# Patient Record
Sex: MALE | Race: ASIAN | NOT HISPANIC OR LATINO | URBAN - METROPOLITAN AREA
[De-identification: names, ages, dates, MRNs, and addresses within clinical notes are randomized per-mention and may not be internally consistent; named-entity substitution may affect disease eponyms.]

---

## 2020-01-04 ENCOUNTER — EMERGENCY (EMERGENCY)
Facility: HOSPITAL | Age: 50
LOS: 1 days | Discharge: HOME | End: 2020-01-04
Attending: EMERGENCY MEDICINE | Admitting: EMERGENCY MEDICINE
Payer: COMMERCIAL

## 2020-01-04 VITALS
SYSTOLIC BLOOD PRESSURE: 136 MMHG | WEIGHT: 177.91 LBS | RESPIRATION RATE: 17 BRPM | OXYGEN SATURATION: 95 % | HEART RATE: 91 BPM | TEMPERATURE: 100 F | HEIGHT: 66 IN | DIASTOLIC BLOOD PRESSURE: 84 MMHG

## 2020-01-04 VITALS
SYSTOLIC BLOOD PRESSURE: 121 MMHG | OXYGEN SATURATION: 100 % | HEART RATE: 91 BPM | TEMPERATURE: 99 F | RESPIRATION RATE: 18 BRPM | DIASTOLIC BLOOD PRESSURE: 75 MMHG

## 2020-01-04 PROCEDURE — 71046 X-RAY EXAM CHEST 2 VIEWS: CPT | Mod: 26

## 2020-01-04 PROCEDURE — 99283 EMERGENCY DEPT VISIT LOW MDM: CPT

## 2020-01-04 PROCEDURE — 71046 X-RAY EXAM CHEST 2 VIEWS: CPT

## 2020-01-04 PROCEDURE — 99284 EMERGENCY DEPT VISIT MOD MDM: CPT | Mod: 25

## 2020-01-04 RX ORDER — ACETAMINOPHEN 500 MG
650 TABLET ORAL ONCE
Refills: 0 | Status: COMPLETED | OUTPATIENT
Start: 2020-01-04 | End: 2020-01-04

## 2020-01-04 RX ORDER — FLUTICASONE PROPIONATE 50 MCG
1 SPRAY, SUSPENSION NASAL
Qty: 1 | Refills: 0
Start: 2020-01-04 | End: 2020-01-10

## 2020-01-04 RX ADMIN — Medication 650 MILLIGRAM(S): at 13:11

## 2020-01-04 RX ADMIN — Medication 1 TABLET(S): at 13:05

## 2020-01-04 RX ADMIN — Medication 650 MILLIGRAM(S): at 12:30

## 2020-01-04 NOTE — ED PROVIDER NOTE - OBJECTIVE STATEMENT
Pt is a 51 yo male with pmhx of htn CAD stent x4 on asa c/o of productive cough post nasal drip headache and fever for one week. Pt states his son was also sick. Pt took Tylenol last night. Pt denies any nvd rash abdominal pain nvd recent travels leg swelling. Pt did not get flu shot this year

## 2020-01-04 NOTE — ED PROVIDER NOTE - PATIENT PORTAL LINK FT
You can access the FollowMyHealth Patient Portal offered by [facility] by registering at the following website: [email or web address]. By joining [email or web address]’s FollowMyHealth portal, you will also be able to view your health information using other applications (apps) compatible with our system.

## 2020-01-04 NOTE — ED PROVIDER NOTE - ATTENDING CONTRIBUTION TO CARE
Pt is a 51 yo male who presents to the ED with a cc of cough, congestion.  Pt with no significant past medical history.  Reports that symptoms first began this past Sunday.  He reports nasal congestion, with cough productive of clear to white sputum, fevers T max of 102, and frontal HA.  He has been taking Tylenol without improvement in symptoms.  Pt has not followed with his PMD for this.  Denies N/V/D/C, CP, SOB, abd pain, ext numbness.  Denies neck pain.  Pt denies ear pain.  He does report that he Pt is a 51 yo male who presents to the ED with a cc of cough, congestion.  Pt with no significant past medical history.  Reports that symptoms first began this past Sunday.  He reports nasal congestion, with cough productive of clear to white sputum, fevers T max of 102, and frontal HA.  He has been taking Tylenol without improvement in symptoms.  Last took Tylenol last night.  Pt has not followed with his PMD for this.  Denies N/V/D/C, CP, SOB, abd pain, ext numbness.  Denies neck pain.  Pt denies ear pain.  He does report that he has a sore throat after he has been coughing for some time but denies difficulty swallowing.  On exam pt lying in bed NAD, NCAT, nasal congestion noted, TTP to bilateral frontal and maxillary sinus regions, heart RRR, lungs CTA, abd soft NT/ND.  FROM of neck with no meningeal signs.  Pt with what appears to be acute sinusitis.  Out of the window for treatment for flu so no utility for swab.  Will obtain chest x-ray.  Pt appears stable for outpatient therapy.  No evidence of hypoxia

## 2020-01-04 NOTE — ED PROVIDER NOTE - ENMT, MLM
Airway patent, + nasal congestion Mouth with normal mucosa. Throat has no vesicles, no oropharyngeal exudates and uvula is midline.

## 2020-01-04 NOTE — ED ADULT TRIAGE NOTE - LOCATION:
Left arm; Eye Protection Verbiage: Before proceeding with the stage, a plastic scleral shield was inserted. The globe was anesthetized with a few drops of 1% lidocaine with 1:100,000 epinephrine. Then, an appropriate sized scleral shield was chosen and coated with lacrilube ointment. The shield was gently inserted and left in place for the duration of each stage. After the stage was completed, the shield was gently removed.

## 2020-08-29 ENCOUNTER — EMERGENCY (EMERGENCY)
Facility: HOSPITAL | Age: 50
LOS: 1 days | Discharge: HOME | End: 2020-08-29
Attending: EMERGENCY MEDICINE | Admitting: EMERGENCY MEDICINE
Payer: COMMERCIAL

## 2020-08-29 VITALS
WEIGHT: 175.05 LBS | HEART RATE: 74 BPM | TEMPERATURE: 99 F | OXYGEN SATURATION: 97 % | DIASTOLIC BLOOD PRESSURE: 90 MMHG | HEIGHT: 66 IN | RESPIRATION RATE: 16 BRPM | SYSTOLIC BLOOD PRESSURE: 147 MMHG

## 2020-08-29 VITALS
TEMPERATURE: 98 F | SYSTOLIC BLOOD PRESSURE: 162 MMHG | OXYGEN SATURATION: 98 % | RESPIRATION RATE: 16 BRPM | HEART RATE: 53 BPM | DIASTOLIC BLOOD PRESSURE: 89 MMHG

## 2020-08-29 PROCEDURE — 12001 RPR S/N/AX/GEN/TRNK 2.5CM/<: CPT

## 2020-08-29 PROCEDURE — 99283 EMERGENCY DEPT VISIT LOW MDM: CPT

## 2020-08-29 PROCEDURE — 90715 TDAP VACCINE 7 YRS/> IM: CPT

## 2020-08-29 PROCEDURE — 99284 EMERGENCY DEPT VISIT MOD MDM: CPT | Mod: 25

## 2020-08-29 PROCEDURE — 90471 IMMUNIZATION ADMIN: CPT

## 2020-08-29 RX ORDER — IBUPROFEN 200 MG
600 TABLET ORAL ONCE
Refills: 0 | Status: COMPLETED | OUTPATIENT
Start: 2020-08-29 | End: 2020-08-29

## 2020-08-29 RX ORDER — TETANUS TOXOID, REDUCED DIPHTHERIA TOXOID AND ACELLULAR PERTUSSIS VACCINE, ADSORBED 5; 2.5; 8; 8; 2.5 [IU]/.5ML; [IU]/.5ML; UG/.5ML; UG/.5ML; UG/.5ML
0.5 SUSPENSION INTRAMUSCULAR ONCE
Refills: 0 | Status: COMPLETED | OUTPATIENT
Start: 2020-08-29 | End: 2020-08-29

## 2020-08-29 RX ADMIN — Medication 600 MILLIGRAM(S): at 22:02

## 2020-08-29 RX ADMIN — TETANUS TOXOID, REDUCED DIPHTHERIA TOXOID AND ACELLULAR PERTUSSIS VACCINE, ADSORBED 0.5 MILLILITER(S): 5; 2.5; 8; 8; 2.5 SUSPENSION INTRAMUSCULAR at 20:58

## 2020-08-29 RX ADMIN — Medication 600 MILLIGRAM(S): at 22:07

## 2020-08-29 RX ADMIN — Medication 1 TABLET(S): at 20:58

## 2020-08-29 NOTE — ED PROVIDER NOTE - CLINICAL SUMMARY MEDICAL DECISION MAKING FREE TEXT BOX
pt with flap laceration / near avulsion of distal left 5th finger skin,  pt with active bleeding, will hold pressure and attempt to dermabond skin flap in place since with .5cm region still attached and with good blood supply. abx,  fu hand pt with flap laceration / near avulsion of distal left 5th finger skin,  pt with active bleeding, will hold pressure and attempt to dermabond skin flap in place since with .5cm region still attached and with good blood supply. abx,  wound check tuesday

## 2020-08-29 NOTE — ED ADULT NURSE NOTE - NSIMPLEMENTINTERV_GEN_ALL_ED
Implemented All Universal Safety Interventions:  [place] to call system. Call bell, personal items and telephone within reach. Instruct patient to call for assistance. Room bathroom lighting operational. Non-slip footwear when patient is off stretcher. Physically safe environment: no spills, clutter or unnecessary equipment. Stretcher in lowest position, wheels locked, appropriate side rails in place.

## 2020-08-29 NOTE — ED PROVIDER NOTE - PATIENT PORTAL LINK FT
You can access the FollowMyHealth Patient Portal offered by [facility] by registering at the following website: [email or web address]. By joining [employer]’s FollowMyHealth portal, you will also be able to view your health information using other applications (apps) compatible with our system.

## 2020-08-29 NOTE — ED PROVIDER NOTE - NSFOLLOWUPINSTRUCTIONS_ED_ALL_ED_FT
do not apply ointment.   return for increasing pain, redness, pus,  take motrin for mild pain,  keep covered with non stick guaze or band aid.  take augmentin as directed

## 2020-08-29 NOTE — ED PROVIDER NOTE - NO SIGNIFICANT PAST SURGICAL HISTORY
Progress Notes by Dreyer, Roman, MD at 08/07/18 10:29 AM     Author:  Dreyer, Roman, MD Service:  (none) Author Type:  Physician     Filed:  08/07/18 10:46 AM Encounter Date:  8/7/2018 Status:  Signed     :  Dreyer, Roman, MD (Physician)              Alexsander Davis is a very pleasant[RD1.1C] 59 year old[RD1.2T] male, here for:[RD1.1C]        4 days of[RD1.2M] right[RD1.2T] knee pain  Lateral knee  Took tylenol with some relief[RD1.2M]  Alexsander[RD1.2T] denies injury or trauma  He woke up with the pain.  He kept exercising[RD1.2M] without[RD1.2T] relief.  He has pain with walking and on the stairs.  There is no pain during the night while sleeping.  He did put ice on it.  Denies swelling  Denies instability sensation.[RD1.2M]       PMH:  -hypertension  -mild hyperlipidemia  -benign prostatic hypertrophy    -lipomatous mass on the upper thoracic, lower occipital area.  -screening colonoscopy: declines.  -Neck lipoma resection 1/2016  -appendectomy 3/2018      SOCIAL HX:  -no cig  -occ etoh  -work:  at [employer].    FAMILY HX:  None    MEDS LIST:[RD1.1C]  Current Outpatient Prescriptions     Medication  Sig   • losartan (COZAAR) 25 MG tablet Take 1 Tab by mouth daily.   • doxazosin (CARDURA) 2 MG tablet TAKE 1 TABLET 30 MINUTES BEFORE BEDTIME   • sildenafil (VIAGRA) 50 MG tablet Take 1 Tab by mouth as needed for Erectile Dysfunction.[RD1.2T]        ROS:[RD1.1C]  12 point review of systems negative, other than the HPI[RD1.3T]       -------------------------    PHYSICAL EXAM:[RD1.1C]    /86  Pulse 90  Temp 98.4 °F (36.9 °C)  Ht 5' 2\" (1.575 m)  Wt 188 lb (85.3 kg)  BMI 34.39 kg/m2[RD1.2T]       GENERAL:  He is a well developed, well-nourished male, no acute distress,  ambulatory, alert/oriented x 3    HEAD: normocephalic, atraumatic, no scalp lesions, no rashes seen.    EYES:  PERRL, EOMI, Right eye: non-injected conjunctiva, no icterus.  Left eye: non-injected conjunctiva, no icterus.     NECK:   Supple, normal flexion/extension/rotation, no lymphadenopathy, no thyromegaly, no jvd, no carotid bruits[RD1.1C]        right[RD1.3M] knee examination.  [+][RD1.3T] mildly painful during weight bearing  Palpation: mildly tender lateral knee to palpation, below the joint line.[RD1.3M]  1. Alignment: normal   2. Range of motion: normal    3. Effusion: normal    4. Joint line tenderness:[RD1.3T] none[RD1.3M]   5. Lachman test:[RD1.3T] *-[RD1.3M]    6. Anterior drawer test:[RD1.3T] -[RD1.3M]   7. Posterior drawer test:[RD1.3T] -[RD1.3M]   8. Lateral pivot shift:[RD1.3T] -[RD1.3M]   9. Kylee test:[RD1.3T] -[RD1.3M]        -------------------------     ASSESSMENT / PLAN:[RD1.1C]      1. Acute pain of right knee[RD1.4T]  normal range of motion[RD1.3T], no signs of meniscus or ligament injury  Most likely has mild bursitis or patello-femoral discomfort  Recommend: ice 15 minutes 2 times daily  Relafen 500 mg bid with food x 10 days  If not improved in 10 days, call us: May need imaging and[RD1.3M] Orthopedics[RD1.3T] consultation[RD1.3M]     FOLLOW UP VISIT with R. DREYER, MD in Dec , 2018  or earlier, if needed.     All of the instruction documented above was provided to the patient in writing via an After-Visit Summary.      The patient indicates understanding of these issues and agrees with the plan.  _______________[RD1.1C]  Electronically Signed by:    Roman Dreyer, MD , 8/7/2018[RD1.2T]      Revision History        User Key Date/Time User Provider Type Action    > RD1.4 08/07/18 10:46 AM Dreyer, Roman, MD Physician Sign     RD1.3 08/07/18 10:40 AM Dreyer, Roman, MD Physician      RD1.2 08/07/18 10:32 AM Dreyer, Roman, MD Physician      RD1.1 08/07/18 10:29 AM Dreyer, Roman, MD Physician     C - Copied, M - Manual, T - Template             <<----- Click to add NO significant Past Surgical History

## 2020-08-29 NOTE — ED PROVIDER NOTE - SKIN, MLM
Skin normal color for race, warm, dry , left 5th digit on medial aspect with near avulsion of skin 1cm x .3 cm and attached by .5cm on more volar aspect. when flap lifted  wound goes moderately deep with sq tissue and bleeding

## 2020-08-29 NOTE — ED ADULT NURSE NOTE - OBJECTIVE STATEMENT
Patient complaining of laceration to left small finger sustained while getting something in his car dressing noted to area cleansed the finger waiting for MD evaluation.

## 2020-08-29 NOTE — ED PROVIDER NOTE - OBJECTIVE STATEMENT
Pt is a 49 yo male pmhx htn, right hand dominant, ? last td. pt had box start to fall and grazed pts left 5th digit, no crush. pt with bleeding from left 5th digit which wont stop.  no other injuries, from.  pt states pain only to touch.

## 2020-09-04 ENCOUNTER — INPATIENT (INPATIENT)
Facility: HOSPITAL | Age: 50
LOS: 2 days | Discharge: HOME | DRG: 247 | End: 2020-09-07
Attending: HOSPITALIST | Admitting: HOSPITALIST
Payer: COMMERCIAL

## 2020-09-04 VITALS
RESPIRATION RATE: 16 BRPM | HEART RATE: 78 BPM | DIASTOLIC BLOOD PRESSURE: 70 MMHG | WEIGHT: 175.05 LBS | OXYGEN SATURATION: 100 % | TEMPERATURE: 97 F | SYSTOLIC BLOOD PRESSURE: 150 MMHG | HEIGHT: 66 IN

## 2020-09-04 LAB — GAS PNL BLDV: SIGNIFICANT CHANGE UP

## 2020-09-04 PROCEDURE — 99285 EMERGENCY DEPT VISIT HI MDM: CPT

## 2020-09-04 PROCEDURE — 93010 ELECTROCARDIOGRAM REPORT: CPT

## 2020-09-04 NOTE — ED PROVIDER NOTE - EKG #1 DATE/TIME
04-Sep-2020 23:32 [facility]  Report of Consultation    Sarath Marcial Patient Status:  Observation    1942 MRN [id number]   [facility] 4NW-A Attending Blayne Mendenhall MD   Hosp Day # 0 PCP Andreas Hernandez MD     Reason for Consultation: Abnormal CT Laterality Date   • ANGIOPLASTY (CORONARY)  1999       • APPENDECTOMY      teenager   • BONE MARROW BIOPSY AND ASPIRATION  12/2012    Hantel-neg   • CABG  10/25/13    AORTIC VALVE REPLACEMENT   • CARDIAC CATHETERIZATION  2008/2013   • Adela Conklin time  aspirin 81 MG Oral Tab, Take 1 tablet (81 mg total) by mouth 3 (three) times a week., Disp: , Rfl: 0, 1/8/2020 at Unknown time  Rosuvastatin Calcium 20 MG Oral Tab, Take 0.5 tablets (10 mg total) by mouth nightly., Disp: 90 tablet, Rfl: 3, Past Week (3.125 mg total) by mouth 2 (two) times daily with meals. , Disp: 180 tablet, Rfl: 3, 1/9/2020 at Unknown time  Calcium Carbonate Antacid (TUMS FRESHERS) 500 MG Oral Chew Tab, Chew 1 tablet by mouth nightly.  , Disp: , Rfl: 0      Review of Systems:   Const 1900  Gross per 24 hour   Intake 260 ml   Output —   Net 260 ml       Physical Exam:   General: alert, cooperative, oriented. No respiratory distress. Head: Normocephalic, without obvious abnormality, atraumatic. Eyes: Conjunctivae/corneas clear.   No speak against the development of a several centimeter cancer and so rapid period of time. I agree with the recommendation that he complete a course of antibiotic therapy and have a follow-up CT scan of the chest in about 6 weeks.   We will follow along wit

## 2020-09-04 NOTE — ED PROVIDER NOTE - CLINICAL SUMMARY MEDICAL DECISION MAKING FREE TEXT BOX
Pt p/w sob w/ EKG changes per EMS, c/f ACS vs new CHF vs less likely infectious or PE. Plan: labs, EKG, cxr, pt rec'd asa and NTG, likely admit

## 2020-09-04 NOTE — ED PROVIDER NOTE - ATTENDING CONTRIBUTION TO CARE
attending [employer]: 50yM h/o CAD and MI with prior stent, former smoker, HTN, HLD BIBEMS for dyspnea that started after eating dinner. Denies chest pain. Given 325 ASA and 1 SL nitro by EMS with improved symptoms. EKG with EMS with inferior and anterior ST depressions with ?elevation in aVR. Last cath many years ago. Denies syncope, nausea/vomiting. On exam, well appearing, mild diaphoresis, S1S2 normal, lungs grossly clear, no LE edema. Repeat EKG without true JUSTIN. Will place on tele, obtain serial EKGs, labs including trop, cardiology eval for cath, admit

## 2020-09-04 NOTE — ED ADULT NURSE NOTE - OBJECTIVE STATEMENT
Patient is a 50 year old male complaining of sob. Patient has history of stents x2, htn, hld, MI. Patient is A&O x 4. Pt reports SOB starting after eating dinner. pt denies chest pain/ tightness. pt states he recently stopped taking Plavix as per his pcp. pt states since arriving to ED SOB has resolved, pt took home dose of 81mg Asprin at home, as per ems gave 3 x 81mg Asprin and one dose of nitro. pt arrived with left 20g in hand. as per ems pt had abnormal ekg. Denies complaints of chest pain, fevers, chills, n/v/d, headache, syncope, burning urination, blood in urine, blood in stool. Abd is soft, non tender, non distended. Skin is warm and dry. Color is consistent with ethnicity. Safety and comfort maintained. Will continue to monitor.

## 2020-09-04 NOTE — ED PROVIDER NOTE - OBJECTIVE STATEMENT
50 M hx HTN HLD CAD s/p stent in 2007 p/w SoB, starting after eating dinner 30 min after finishing. Aassociated with light hedaedness but did not pass out. He was feeling fine earlier in the day. Denies CP, n/v, abd pain, diaphoresis,     Card Dr Hampton of [facility] 50 M hx HTN HLD CAD s/p stent in 2007 p/w SoB, starting after eating dinner 30 min after finishing. Associated with light hedaedness but did not pass out. He was feeling fine earlier in the day. Denies CP, n/v, abd pain, diaphoresis. Rec' 1 spray NTG and 325 ASA By EMS.     Card Dr Hampton of [facility]

## 2020-09-05 DIAGNOSIS — I21.4 NON-ST ELEVATION (NSTEMI) MYOCARDIAL INFARCTION: ICD-10-CM

## 2020-09-05 DIAGNOSIS — E78.5 HYPERLIPIDEMIA, UNSPECIFIED: ICD-10-CM

## 2020-09-05 DIAGNOSIS — I10 ESSENTIAL (PRIMARY) HYPERTENSION: ICD-10-CM

## 2020-09-05 LAB
ALBUMIN SERPL ELPH-MCNC: 4.5 G/DL — SIGNIFICANT CHANGE UP (ref 3.3–5)
ALP SERPL-CCNC: 81 U/L — SIGNIFICANT CHANGE UP (ref 40–120)
ALT FLD-CCNC: 31 U/L — SIGNIFICANT CHANGE UP (ref 10–45)
ANION GAP SERPL CALC-SCNC: 12 MMOL/L — SIGNIFICANT CHANGE UP (ref 5–17)
ANION GAP SERPL CALC-SCNC: 9 MMOL/L — SIGNIFICANT CHANGE UP (ref 5–17)
APTT BLD: 100.6 SEC — HIGH (ref 27.5–35.5)
APTT BLD: 30.4 SEC — SIGNIFICANT CHANGE UP (ref 27.5–35.5)
AST SERPL-CCNC: 31 U/L — SIGNIFICANT CHANGE UP (ref 10–40)
BASOPHILS # BLD AUTO: 0.06 K/UL — SIGNIFICANT CHANGE UP (ref 0–0.2)
BASOPHILS NFR BLD AUTO: 0.6 % — SIGNIFICANT CHANGE UP (ref 0–2)
BILIRUB SERPL-MCNC: 0.8 MG/DL — SIGNIFICANT CHANGE UP (ref 0.2–1.2)
BUN SERPL-MCNC: 14 MG/DL — SIGNIFICANT CHANGE UP (ref 7–23)
BUN SERPL-MCNC: 17 MG/DL — SIGNIFICANT CHANGE UP (ref 7–23)
CALCIUM SERPL-MCNC: 8.8 MG/DL — SIGNIFICANT CHANGE UP (ref 8.4–10.5)
CALCIUM SERPL-MCNC: 9.7 MG/DL — SIGNIFICANT CHANGE UP (ref 8.4–10.5)
CHLORIDE SERPL-SCNC: 104 MMOL/L — SIGNIFICANT CHANGE UP (ref 96–108)
CHLORIDE SERPL-SCNC: 107 MMOL/L — SIGNIFICANT CHANGE UP (ref 96–108)
CK MB BLD-MCNC: 7 % — HIGH (ref 0–3.5)
CK MB CFR SERPL CALC: 64.1 NG/ML — HIGH (ref 0–6.7)
CK SERPL-CCNC: 920 U/L — HIGH (ref 30–200)
CO2 SERPL-SCNC: 25 MMOL/L — SIGNIFICANT CHANGE UP (ref 22–31)
CO2 SERPL-SCNC: 26 MMOL/L — SIGNIFICANT CHANGE UP (ref 22–31)
CREAT SERPL-MCNC: 0.96 MG/DL — SIGNIFICANT CHANGE UP (ref 0.5–1.3)
CREAT SERPL-MCNC: 1.14 MG/DL — SIGNIFICANT CHANGE UP (ref 0.5–1.3)
EOSINOPHIL # BLD AUTO: 0.22 K/UL — SIGNIFICANT CHANGE UP (ref 0–0.5)
EOSINOPHIL NFR BLD AUTO: 2.1 % — SIGNIFICANT CHANGE UP (ref 0–6)
GLUCOSE SERPL-MCNC: 138 MG/DL — HIGH (ref 70–99)
GLUCOSE SERPL-MCNC: 154 MG/DL — HIGH (ref 70–99)
HCT VFR BLD CALC: 40.2 % — SIGNIFICANT CHANGE UP (ref 39–50)
HCT VFR BLD CALC: 42.9 % — SIGNIFICANT CHANGE UP (ref 39–50)
HGB BLD-MCNC: 13.4 G/DL — SIGNIFICANT CHANGE UP (ref 13–17)
HGB BLD-MCNC: 14.6 G/DL — SIGNIFICANT CHANGE UP (ref 13–17)
IMM GRANULOCYTES NFR BLD AUTO: 0.3 % — SIGNIFICANT CHANGE UP (ref 0–1.5)
INR BLD: 0.96 RATIO — SIGNIFICANT CHANGE UP (ref 0.88–1.16)
LYMPHOCYTES # BLD AUTO: 4.34 K/UL — HIGH (ref 1–3.3)
LYMPHOCYTES # BLD AUTO: 42.3 % — SIGNIFICANT CHANGE UP (ref 13–44)
MAGNESIUM SERPL-MCNC: 2.2 MG/DL — SIGNIFICANT CHANGE UP (ref 1.6–2.6)
MCHC RBC-ENTMCNC: 27.6 PG — SIGNIFICANT CHANGE UP (ref 27–34)
MCHC RBC-ENTMCNC: 27.9 PG — SIGNIFICANT CHANGE UP (ref 27–34)
MCHC RBC-ENTMCNC: 33.3 GM/DL — SIGNIFICANT CHANGE UP (ref 32–36)
MCHC RBC-ENTMCNC: 34 GM/DL — SIGNIFICANT CHANGE UP (ref 32–36)
MCV RBC AUTO: 82 FL — SIGNIFICANT CHANGE UP (ref 80–100)
MCV RBC AUTO: 82.7 FL — SIGNIFICANT CHANGE UP (ref 80–100)
MONOCYTES # BLD AUTO: 1.01 K/UL — HIGH (ref 0–0.9)
MONOCYTES NFR BLD AUTO: 9.8 % — SIGNIFICANT CHANGE UP (ref 2–14)
NEUTROPHILS # BLD AUTO: 4.61 K/UL — SIGNIFICANT CHANGE UP (ref 1.8–7.4)
NEUTROPHILS NFR BLD AUTO: 44.9 % — SIGNIFICANT CHANGE UP (ref 43–77)
NRBC # BLD: 0 /100 WBCS — SIGNIFICANT CHANGE UP (ref 0–0)
NRBC # BLD: 0 /100 WBCS — SIGNIFICANT CHANGE UP (ref 0–0)
NT-PROBNP SERPL-SCNC: 26 PG/ML — SIGNIFICANT CHANGE UP (ref 0–300)
PLATELET # BLD AUTO: 182 K/UL — SIGNIFICANT CHANGE UP (ref 150–400)
PLATELET # BLD AUTO: 198 K/UL — SIGNIFICANT CHANGE UP (ref 150–400)
POTASSIUM SERPL-MCNC: 3.3 MMOL/L — LOW (ref 3.5–5.3)
POTASSIUM SERPL-MCNC: 3.7 MMOL/L — SIGNIFICANT CHANGE UP (ref 3.5–5.3)
POTASSIUM SERPL-SCNC: 3.3 MMOL/L — LOW (ref 3.5–5.3)
POTASSIUM SERPL-SCNC: 3.7 MMOL/L — SIGNIFICANT CHANGE UP (ref 3.5–5.3)
PROT SERPL-MCNC: 7.8 G/DL — SIGNIFICANT CHANGE UP (ref 6–8.3)
PROTHROM AB SERPL-ACNC: 11.5 SEC — SIGNIFICANT CHANGE UP (ref 10.6–13.6)
RBC # BLD: 4.86 M/UL — SIGNIFICANT CHANGE UP (ref 4.2–5.8)
RBC # BLD: 5.23 M/UL — SIGNIFICANT CHANGE UP (ref 4.2–5.8)
RBC # FLD: 12.3 % — SIGNIFICANT CHANGE UP (ref 10.3–14.5)
RBC # FLD: 12.5 % — SIGNIFICANT CHANGE UP (ref 10.3–14.5)
SARS-COV-2 RNA SPEC QL NAA+PROBE: SIGNIFICANT CHANGE UP
SODIUM SERPL-SCNC: 141 MMOL/L — SIGNIFICANT CHANGE UP (ref 135–145)
SODIUM SERPL-SCNC: 142 MMOL/L — SIGNIFICANT CHANGE UP (ref 135–145)
TROPONIN T, HIGH SENSITIVITY RESULT: 24 NG/L — SIGNIFICANT CHANGE UP (ref 0–51)
TROPONIN T, HIGH SENSITIVITY RESULT: 380 NG/L — HIGH (ref 0–51)
TROPONIN T, HIGH SENSITIVITY RESULT: 698 NG/L — HIGH (ref 0–51)
WBC # BLD: 10.27 K/UL — SIGNIFICANT CHANGE UP (ref 3.8–10.5)
WBC # BLD: 9.83 K/UL — SIGNIFICANT CHANGE UP (ref 3.8–10.5)
WBC # FLD AUTO: 10.27 K/UL — SIGNIFICANT CHANGE UP (ref 3.8–10.5)
WBC # FLD AUTO: 9.83 K/UL — SIGNIFICANT CHANGE UP (ref 3.8–10.5)

## 2020-09-05 PROCEDURE — 71045 X-RAY EXAM CHEST 1 VIEW: CPT | Mod: 26

## 2020-09-05 PROCEDURE — 93458 L HRT ARTERY/VENTRICLE ANGIO: CPT | Mod: 26,59

## 2020-09-05 PROCEDURE — 99223 1ST HOSP IP/OBS HIGH 75: CPT

## 2020-09-05 PROCEDURE — 75710 ARTERY X-RAYS ARM/LEG: CPT | Mod: 26,LT,XU

## 2020-09-05 PROCEDURE — 99255 IP/OBS CONSLTJ NEW/EST HI 80: CPT

## 2020-09-05 PROCEDURE — 99152 MOD SED SAME PHYS/QHP 5/>YRS: CPT

## 2020-09-05 PROCEDURE — 92941 PRQ TRLML REVSC TOT OCCL AMI: CPT | Mod: LD

## 2020-09-05 PROCEDURE — 99233 SBSQ HOSP IP/OBS HIGH 50: CPT

## 2020-09-05 PROCEDURE — 92978 ENDOLUMINL IVUS OCT C 1ST: CPT | Mod: 26,LD

## 2020-09-05 RX ORDER — HEPARIN SODIUM 5000 [USP'U]/ML
5000 INJECTION INTRAVENOUS; SUBCUTANEOUS ONCE
Refills: 0 | Status: DISCONTINUED | OUTPATIENT
Start: 2020-09-05 | End: 2020-09-05

## 2020-09-05 RX ORDER — HEPARIN SODIUM 5000 [USP'U]/ML
5000 INJECTION INTRAVENOUS; SUBCUTANEOUS ONCE
Refills: 0 | Status: COMPLETED | OUTPATIENT
Start: 2020-09-05 | End: 2020-09-05

## 2020-09-05 RX ORDER — HEPARIN SODIUM 5000 [USP'U]/ML
5300 INJECTION INTRAVENOUS; SUBCUTANEOUS EVERY 6 HOURS
Refills: 0 | Status: DISCONTINUED | OUTPATIENT
Start: 2020-09-05 | End: 2020-09-05

## 2020-09-05 RX ORDER — LISINOPRIL 2.5 MG/1
40 TABLET ORAL DAILY
Refills: 0 | Status: DISCONTINUED | OUTPATIENT
Start: 2020-09-05 | End: 2020-09-07

## 2020-09-05 RX ORDER — POTASSIUM CHLORIDE 20 MEQ
40 PACKET (EA) ORAL ONCE
Refills: 0 | Status: DISCONTINUED | OUTPATIENT
Start: 2020-09-05 | End: 2020-09-05

## 2020-09-05 RX ORDER — TICAGRELOR 90 MG/1
180 TABLET ORAL ONCE
Refills: 0 | Status: COMPLETED | OUTPATIENT
Start: 2020-09-05 | End: 2020-09-05

## 2020-09-05 RX ORDER — HEPARIN SODIUM 5000 [USP'U]/ML
INJECTION INTRAVENOUS; SUBCUTANEOUS
Qty: 25000 | Refills: 0 | Status: DISCONTINUED | OUTPATIENT
Start: 2020-09-05 | End: 2020-09-05

## 2020-09-05 RX ORDER — POTASSIUM CHLORIDE 20 MEQ
40 PACKET (EA) ORAL ONCE
Refills: 0 | Status: COMPLETED | OUTPATIENT
Start: 2020-09-05 | End: 2020-09-05

## 2020-09-05 RX ORDER — FAMOTIDINE 10 MG/ML
20 INJECTION INTRAVENOUS ONCE
Refills: 0 | Status: COMPLETED | OUTPATIENT
Start: 2020-09-05 | End: 2020-09-05

## 2020-09-05 RX ORDER — ATORVASTATIN CALCIUM 80 MG/1
40 TABLET, FILM COATED ORAL AT BEDTIME
Refills: 0 | Status: DISCONTINUED | OUTPATIENT
Start: 2020-09-05 | End: 2020-09-07

## 2020-09-05 RX ORDER — TICAGRELOR 90 MG/1
90 TABLET ORAL EVERY 12 HOURS
Refills: 0 | Status: DISCONTINUED | OUTPATIENT
Start: 2020-09-05 | End: 2020-09-07

## 2020-09-05 RX ORDER — ASPIRIN/CALCIUM CARB/MAGNESIUM 324 MG
81 TABLET ORAL DAILY
Refills: 0 | Status: DISCONTINUED | OUTPATIENT
Start: 2020-09-05 | End: 2020-09-07

## 2020-09-05 RX ORDER — ACETAMINOPHEN 500 MG
650 TABLET ORAL EVERY 6 HOURS
Refills: 0 | Status: DISCONTINUED | OUTPATIENT
Start: 2020-09-05 | End: 2020-09-07

## 2020-09-05 RX ORDER — METOPROLOL TARTRATE 50 MG
100 TABLET ORAL DAILY
Refills: 0 | Status: DISCONTINUED | OUTPATIENT
Start: 2020-09-05 | End: 2020-09-07

## 2020-09-05 RX ORDER — TICAGRELOR 90 MG/1
180 TABLET ORAL ONCE
Refills: 0 | Status: DISCONTINUED | OUTPATIENT
Start: 2020-09-05 | End: 2020-09-05

## 2020-09-05 RX ORDER — INFLUENZA VIRUS VACCINE 15; 15; 15; 15 UG/.5ML; UG/.5ML; UG/.5ML; UG/.5ML
0.5 SUSPENSION INTRAMUSCULAR ONCE
Refills: 0 | Status: COMPLETED | OUTPATIENT
Start: 2020-09-05 | End: 2020-09-07

## 2020-09-05 RX ADMIN — Medication 40 MILLIEQUIVALENT(S): at 01:31

## 2020-09-05 RX ADMIN — Medication 100 MILLIGRAM(S): at 15:11

## 2020-09-05 RX ADMIN — FAMOTIDINE 20 MILLIGRAM(S): 10 INJECTION INTRAVENOUS at 01:30

## 2020-09-05 RX ADMIN — Medication 81 MILLIGRAM(S): at 13:24

## 2020-09-05 RX ADMIN — HEPARIN SODIUM 1000 UNIT(S)/HR: 5000 INJECTION INTRAVENOUS; SUBCUTANEOUS at 04:29

## 2020-09-05 RX ADMIN — HEPARIN SODIUM 5000 UNIT(S): 5000 INJECTION INTRAVENOUS; SUBCUTANEOUS at 04:29

## 2020-09-05 RX ADMIN — TICAGRELOR 180 MILLIGRAM(S): 90 TABLET ORAL at 05:46

## 2020-09-05 RX ADMIN — TICAGRELOR 90 MILLIGRAM(S): 90 TABLET ORAL at 17:20

## 2020-09-05 RX ADMIN — LISINOPRIL 40 MILLIGRAM(S): 2.5 TABLET ORAL at 15:28

## 2020-09-05 RX ADMIN — Medication 30 MILLILITER(S): at 01:30

## 2020-09-05 RX ADMIN — Medication 650 MILLIGRAM(S): at 18:21

## 2020-09-05 RX ADMIN — ATORVASTATIN CALCIUM 40 MILLIGRAM(S): 80 TABLET, FILM COATED ORAL at 21:44

## 2020-09-05 NOTE — CONSULT NOTE ADULT - ATTENDING COMMENTS
50 year old male with pat medical history of HTN, HLD, CAD s/p JUDITH 2007 unknown location presenting with NSTEMI type 1 now s/ [facility] with 90% stenosis of both the pLAD and pLCx s/p JUDITH to pLAD and staging the pLCx his weekend. Started on appropriate cardiac medications. Pending repeat ECHO prior to discharge given LAD stenosis.     Pushpa Barrow MD, MPH, CONNOR, RPVI, FACC  Cardiovascular Specialist   [facility]  c: [phone] (text preferred and/or call)  e: [email or web address]

## 2020-09-05 NOTE — H&P ADULT - NSICDXPASTMEDICALHX_GEN_ALL_CORE_FT
PAST MEDICAL HISTORY:  CAD (coronary artery disease)     Hyperlipidemia     Hypertension, unspecified type

## 2020-09-05 NOTE — ED ADULT NURSE REASSESSMENT NOTE - NS ED NURSE REASSESS COMMENT FT1
pt has 3 iv's in place,  Heparin bolus and infusion initiated as per orders based off Heparin nomogram. Second RN present to confirm correct medication administration. vs documented. Patient currently safe and comfortable. Will continue to monitor.

## 2020-09-05 NOTE — H&P ADULT - ASSESSMENT
51yo Man with PMH of HTN, HLD, CAD s/p stent 2017, presents from home with acute onset shortness of breath. Found to have NSTEMI.

## 2020-09-05 NOTE — ED ADULT NURSE REASSESSMENT NOTE - NS ED NURSE REASSESS COMMENT FT1
pt resting in bed, pt denies abdomen pain, SOB and chest pain, blood drawn and sent to lab. vs documented.

## 2020-09-05 NOTE — H&P ADULT - NSHPSOCIALHISTORY_GEN_ALL_CORE
, lives with wife and kids.  (currently out of work due to COVID-19)  Denies alcohol and drug use. Quit smoking >25 years ago.

## 2020-09-05 NOTE — CHART NOTE - NSCHARTNOTEFT_GEN_A_CORE
Underwent LHC today with JUDITH placed in pLAD 95% lesion, needs staged PCI to LCx tomorrow.    -c/w asa 81 and brillinta 90 q12h  -c/w atorvastatin  -R groin is angiosealed, monitor groin site  -Dr. Barrow to follow-up later    Communicated above findings to primary team.

## 2020-09-05 NOTE — H&P ADULT - HISTORY OF PRESENT ILLNESS
51yo Man with PMH of HTN, HLD, CAD s/p stent 2017, presents from home with acute onset shortness of breath. The patient states that he was in normal health, when suddenly last night he developed SOB while going to the bathroom. This symptom was associated with palpitations and bilateral arm numbness. Denies associated chest pain, nausea, vomiting, diaphoresis, and dizziness. Nothing made these symptoms better so he came to the hospital. Vitals here T 96.6, HR 78, /70, , 100% RA. Patient was found to have elevated troponin (NSTEMI); went to angiogram (s/p JUDITH to pLAD). Admitted to medicine- plan for stage PCI LCx tomorrow.

## 2020-09-05 NOTE — CONSULT NOTE ADULT - SUBJECTIVE AND OBJECTIVE BOX
Patient seen and evaluated at bedside    Chief Complaint: Shortness of breath     HPI: 50 M hx HTN HLD CAD s/p stent in 2007 p/w shortness of breath starting after eating dinner 30 min after finishing. Associated with light handedness but did not pass out. He was feeling fine earlier in the day. Pt was in usual state of health prior to this event. Denies CP, n/v, abd pain, diaphoresis. Rec' 1 spray NTG and 325 ASA By EMS. Cardiology consulted for further evaluation. On examination pt resting in stretcher without discomfort. Denies chest pain or shortness of breath.  	   Card Dr Hampton of [facility] [phone]      PMHx: HTN, HLD      PSHx: None       Allergies:  No Known Allergies      Home Meds:  Lipitor 40 mg   Toprolol 100 mg QD   Aspirin 81 mg QD  Lisinopril 40 mg QD     Current Medications:   heparin   Injectable 5300 Unit(s) IV Push every 6 hours PRN  heparin  Infusion.  Unit(s)/Hr IV Continuous <Continuous>      FAMILY HISTORY: Unknown         REVIEW OF SYSTEMS:  CONSTITUTIONAL: No weakness, fevers or chills  NECK: No pain or stiffness  RESPIRATORY: No cough, wheezing, hemoptysis; + shortness of breath  CARDIOVASCULAR: No chest pain or palpitations; No lower extremity edema  GASTROINTESTINAL: No abdominal or epigastric pain.   GENITOURINARY: No dysuria, frequency or hematuria  NEUROLOGICAL: No numbness or weakness  SKIN: No itching, burning, rashes, or lesions   All other review of systems is negative unless indicated above.    Physical Exam:  T(F): 97.6 (09-05), Max: 98 (09-05)  HR: 71 (09-05) (68 - 78)  BP: 141/99 (09-05) (120/80 - 160/110)  RR: 15 (09-05)  SpO2: 100% (09-05)    GENERAL: No acute distress, well-developed  ENT: EOMI, Neck supple, No JVD, moist mucosa  CHEST/LUNG: Clear to auscultation bilaterally; No wheeze, equal breath sounds bilaterally   HEART: Regular rate and rhythm; No murmurs, rubs, or gallops  ABDOMEN: Soft, Nontender, Nondistended  EXTREMITIES:  No clubbing, cyanosis, or edema  PSYCH: Nl behavior, nl affect  NEUROLOGY: AAOx3, non-focal, cranial nerves intact  SKIN: Normal color, No rashes or lesions      Cardiovascular Diagnostic Testing:    ECG: Personally reviewed: Sinus, Twave flattening laterally     Echo: Personally reviewed:      Stress Testing:    Cath:      Imaging:    CXR: Personally reviewed    Labs: Personally reviewed                        14.6   10.27 )-----------( 198      ( 04 Sep 2020 23:50 )             42.9     09-04    142  |  104  |  17  ----------------------------<  138<H>  3.3<L>   |  26  |  1.14    Ca    9.7      04 Sep 2020 23:50  Mg     2.2     09-04    TPro  7.8  /  Alb  4.5  /  TBili  0.8  /  DBili  x   /  AST  31  /  ALT  31  /  AlkPhos  81  09-04    PT/INR - ( 04 Sep 2020 23:50 )   PT: 11.5 sec;   INR: 0.96 ratio         PTT - ( 04 Sep 2020 23:50 )  PTT:30.4 sec      Serum Pro-Brain Natriuretic Peptide: 26 pg/mL (09-04 @ 23:50) Patient seen and evaluated at bedside    Chief Complaint: Shortness of breath     HPI: 50 M hx HTN HLD CAD s/p stent in 2007 p/w shortness of breath starting after eating dinner 30 min after finishing. Associated with light handedness but did not pass out. He was feeling fine earlier in the day. Pt was in usual state of health prior to this event. Denies CP, n/v, abd pain, diaphoresis. Rec' 1 spray NTG and 325 ASA By EMS. Cardiology consulted for further evaluation. On examination pt resting in stretcher without discomfort. Denies chest pain or shortness of breath.  	   Card Dr Hampton of [facility] [phone]    PMHx: HTN, HLD    PSHx: None     Allergies:  No Known Allergies    Home Meds:  Lipitor 40 mg   Toprolol 100 mg QD   Aspirin 81 mg QD  Lisinopril 40 mg QD     Current Medications:   heparin   Injectable 5300 Unit(s) IV Push every 6 hours PRN  heparin  Infusion.  Unit(s)/Hr IV Continuous <Continuous>    FAMILY HISTORY: Unknown     REVIEW OF SYSTEMS:  CONSTITUTIONAL: No weakness, fevers or chills  NECK: No pain or stiffness  RESPIRATORY: No cough, wheezing, hemoptysis; + shortness of breath  CARDIOVASCULAR: No chest pain or palpitations; No lower extremity edema  GASTROINTESTINAL: No abdominal or epigastric pain.   GENITOURINARY: No dysuria, frequency or hematuria  NEUROLOGICAL: No numbness or weakness  SKIN: No itching, burning, rashes, or lesions   All other review of systems is negative unless indicated above.    Physical Exam:  T(F): 97.6 (09-05), Max: 98 (09-05)  HR: 71 (09-05) (68 - 78)  BP: 141/99 (09-05) (120/80 - 160/110)  RR: 15 (09-05)  SpO2: 100% (09-05)    GENERAL: No acute distress, well-developed  ENT: EOMI, Neck supple, No JVD, moist mucosa  CHEST/LUNG: Clear to auscultation bilaterally; No wheeze, equal breath sounds bilaterally   HEART: Regular rate and rhythm; No murmurs, rubs, or gallops  ABDOMEN: Soft, Nontender, Nondistended  EXTREMITIES:  No clubbing, cyanosis, or edema  PSYCH: Nl behavior, nl affect  NEUROLOGY: AAOx3, non-focal, cranial nerves intact  SKIN: Normal color, No rashes or lesions      Cardiovascular Diagnostic Testing:    ECG: Personally reviewed: Sinus, Twave flattening laterally     Echo: Personally reviewed:    Imaging:    CXR: Personally reviewed    Labs: Personally reviewed                        14.6   10.27 )-----------( 198      ( 04 Sep 2020 23:50 )             42.9     09-04    142  |  104  |  17  ----------------------------<  138<H>  3.3<L>   |  26  |  1.14    Ca    9.7      04 Sep 2020 23:50  Mg     2.2     09-04    TPro  7.8  /  Alb  4.5  /  TBili  0.8  /  DBili  x   /  AST  31  /  ALT  31  /  AlkPhos  81  09-04    PT/INR - ( 04 Sep 2020 23:50 )   PT: 11.5 sec;   INR: 0.96 ratio      PTT - ( 04 Sep 2020 23:50 )  PTT:30.4 sec    Serum Pro-Brain Natriuretic Peptide: 26 pg/mL (09-04 @ 23:50)

## 2020-09-05 NOTE — ED ADULT NURSE REASSESSMENT NOTE - NS ED NURSE REASSESS COMMENT FT1
pt complaining of abdominal discomfort, with SOB, pt oxygen 100% on RA, pt placed on 2l nc for comfort, pt states symptoms are worst when laying flat. md epps made aware. pt given gi medication, vs documented. pt denies chest pain.

## 2020-09-05 NOTE — H&P ADULT - NSHPLABSRESULTS_GEN_ALL_CORE
Labs reviewed: Uptrending troponin, Cr normal    CXR personally reviewed: Clear lung fields bilaterally    ECG reviewed and interpreted: No ischemic changes. NSR at 71 bpm, LVH

## 2020-09-05 NOTE — H&P ADULT - PROBLEM SELECTOR PLAN 1
Underwent LHC today with JUDITH placed in pLAD 95% lesion  Needs staged PCI to LCx tomorrow.  Continue aspirin, brillinta, lipitor, metoprolol, lisinopril.  Cardiology following closely.   No evidence of CHF on exam.   Currently feels back at baseline- asymptomatic.

## 2020-09-05 NOTE — CONSULT NOTE ADULT - ASSESSMENT
50 M hx HTN HLD CAD s/p stent in 2007 p/w shortness of breath, found to have elevated troponin concerning for NSTEMI.    Angina   Currently chest pain free, HDS  Unknown location of previous lesions  Given acute shortness of breath and elevated troponin would treat for NSTEMI  Please add CKMB to serum chemistries   Heparin load  DAPT load   Cont high intensity statin   Will discuss further ischemia workup in AM     Stalin Caruso MD  Cardiology Fellow  [phone]    Please check [email or web address] for cardiology service schedule and contact information.

## 2020-09-06 LAB
A1C WITH ESTIMATED AVERAGE GLUCOSE RESULT: 6.1 % — HIGH (ref 4–5.6)
ANION GAP SERPL CALC-SCNC: 10 MMOL/L — SIGNIFICANT CHANGE UP (ref 5–17)
BUN SERPL-MCNC: 9 MG/DL — SIGNIFICANT CHANGE UP (ref 7–23)
CALCIUM SERPL-MCNC: 9.1 MG/DL — SIGNIFICANT CHANGE UP (ref 8.4–10.5)
CHLORIDE SERPL-SCNC: 105 MMOL/L — SIGNIFICANT CHANGE UP (ref 96–108)
CHOLEST SERPL-MCNC: 103 MG/DL — SIGNIFICANT CHANGE UP (ref 10–199)
CO2 SERPL-SCNC: 25 MMOL/L — SIGNIFICANT CHANGE UP (ref 22–31)
CREAT SERPL-MCNC: 0.97 MG/DL — SIGNIFICANT CHANGE UP (ref 0.5–1.3)
ESTIMATED AVERAGE GLUCOSE: 128 MG/DL — HIGH (ref 68–114)
GLUCOSE SERPL-MCNC: 132 MG/DL — HIGH (ref 70–99)
HCT VFR BLD CALC: 39.3 % — SIGNIFICANT CHANGE UP (ref 39–50)
HDLC SERPL-MCNC: 30 MG/DL — LOW
HGB BLD-MCNC: 13.2 G/DL — SIGNIFICANT CHANGE UP (ref 13–17)
HIV 1+2 AB+HIV1 P24 AG SERPL QL IA: SIGNIFICANT CHANGE UP
LIPID PNL WITH DIRECT LDL SERPL: 55 MG/DL — SIGNIFICANT CHANGE UP
MAGNESIUM SERPL-MCNC: 2 MG/DL — SIGNIFICANT CHANGE UP (ref 1.6–2.6)
MCHC RBC-ENTMCNC: 27.8 PG — SIGNIFICANT CHANGE UP (ref 27–34)
MCHC RBC-ENTMCNC: 33.6 GM/DL — SIGNIFICANT CHANGE UP (ref 32–36)
MCV RBC AUTO: 82.7 FL — SIGNIFICANT CHANGE UP (ref 80–100)
NRBC # BLD: 0 /100 WBCS — SIGNIFICANT CHANGE UP (ref 0–0)
PHOSPHATE SERPL-MCNC: 3.2 MG/DL — SIGNIFICANT CHANGE UP (ref 2.5–4.5)
PLATELET # BLD AUTO: 165 K/UL — SIGNIFICANT CHANGE UP (ref 150–400)
POTASSIUM SERPL-MCNC: 3.2 MMOL/L — LOW (ref 3.5–5.3)
POTASSIUM SERPL-SCNC: 3.2 MMOL/L — LOW (ref 3.5–5.3)
RBC # BLD: 4.75 M/UL — SIGNIFICANT CHANGE UP (ref 4.2–5.8)
RBC # FLD: 12.5 % — SIGNIFICANT CHANGE UP (ref 10.3–14.5)
SODIUM SERPL-SCNC: 140 MMOL/L — SIGNIFICANT CHANGE UP (ref 135–145)
TOTAL CHOLESTEROL/HDL RATIO MEASUREMENT: 3.4 RATIO — SIGNIFICANT CHANGE UP (ref 3.4–9.6)
TRIGL SERPL-MCNC: 91 MG/DL — SIGNIFICANT CHANGE UP (ref 10–149)
WBC # BLD: 9.82 K/UL — SIGNIFICANT CHANGE UP (ref 3.8–10.5)
WBC # FLD AUTO: 9.82 K/UL — SIGNIFICANT CHANGE UP (ref 3.8–10.5)

## 2020-09-06 PROCEDURE — 93010 ELECTROCARDIOGRAM REPORT: CPT | Mod: 59

## 2020-09-06 PROCEDURE — 99233 SBSQ HOSP IP/OBS HIGH 50: CPT

## 2020-09-06 PROCEDURE — 92928 PRQ TCAT PLMT NTRAC ST 1 LES: CPT | Mod: LC

## 2020-09-06 PROCEDURE — 92978 ENDOLUMINL IVUS OCT C 1ST: CPT | Mod: 26,LC

## 2020-09-06 PROCEDURE — 93454 CORONARY ARTERY ANGIO S&I: CPT | Mod: 26,59

## 2020-09-06 RX ORDER — POTASSIUM CHLORIDE 20 MEQ
40 PACKET (EA) ORAL EVERY 4 HOURS
Refills: 0 | Status: COMPLETED | OUTPATIENT
Start: 2020-09-06 | End: 2020-09-06

## 2020-09-06 RX ORDER — MORPHINE SULFATE 50 MG/1
2 CAPSULE, EXTENDED RELEASE ORAL ONCE
Refills: 0 | Status: DISCONTINUED | OUTPATIENT
Start: 2020-09-06 | End: 2020-09-06

## 2020-09-06 RX ORDER — HYDRALAZINE HCL 50 MG
5 TABLET ORAL ONCE
Refills: 0 | Status: COMPLETED | OUTPATIENT
Start: 2020-09-06 | End: 2020-09-06

## 2020-09-06 RX ADMIN — Medication 100 MILLIGRAM(S): at 05:51

## 2020-09-06 RX ADMIN — Medication 650 MILLIGRAM(S): at 13:14

## 2020-09-06 RX ADMIN — Medication 40 MILLIEQUIVALENT(S): at 17:04

## 2020-09-06 RX ADMIN — Medication 40 MILLIEQUIVALENT(S): at 21:30

## 2020-09-06 RX ADMIN — ATORVASTATIN CALCIUM 40 MILLIGRAM(S): 80 TABLET, FILM COATED ORAL at 21:30

## 2020-09-06 RX ADMIN — MORPHINE SULFATE 2 MILLIGRAM(S): 50 CAPSULE, EXTENDED RELEASE ORAL at 10:36

## 2020-09-06 RX ADMIN — MORPHINE SULFATE 2 MILLIGRAM(S): 50 CAPSULE, EXTENDED RELEASE ORAL at 10:11

## 2020-09-06 RX ADMIN — Medication 650 MILLIGRAM(S): at 12:46

## 2020-09-06 RX ADMIN — TICAGRELOR 90 MILLIGRAM(S): 90 TABLET ORAL at 17:04

## 2020-09-06 RX ADMIN — Medication 650 MILLIGRAM(S): at 21:31

## 2020-09-06 RX ADMIN — Medication 5 MILLIGRAM(S): at 11:13

## 2020-09-06 RX ADMIN — LISINOPRIL 40 MILLIGRAM(S): 2.5 TABLET ORAL at 05:50

## 2020-09-06 RX ADMIN — Medication 81 MILLIGRAM(S): at 08:30

## 2020-09-06 RX ADMIN — TICAGRELOR 90 MILLIGRAM(S): 90 TABLET ORAL at 05:50

## 2020-09-06 RX ADMIN — Medication 40 MILLIEQUIVALENT(S): at 14:03

## 2020-09-06 NOTE — PROGRESS NOTE ADULT - PROBLEM SELECTOR PLAN 1
Underwent C 9/5: JUDITH placed in pLAD 95% lesion. Also s/p stage PCI today (9/6) JUDITH to LCx  Chest pain port-procedure: cardiology was made aware  Continue aspirin, brillinta, lipitor, metoprolol, lisinopril.  Cardiology following closely.   No evidence of CHF on exam.   Currently feels back at baseline- asymptomatic.

## 2020-09-06 NOTE — CHART NOTE - NSCHARTNOTEFT_GEN_A_CORE
Notified by RN pt c/o Chest pressure. Pt seen and evaluated at bedside. Pt stated he's been having chest pressure on this admission. It's intermittent, located in left side chest, non-radiating, mild. Denies SOB, dizziness, HA, n/v, arm pain, or jaw pain.     Vital Signs Last 24 Hrs  T(C): 36.6 (09-06-20 @ 04:30), Max: 36.6 (09-05-20 @ 08:35)  T(F): 97.9 (09-06-20 @ 04:30), Max: 97.9 (09-06-20 @ 04:30)  HR: 72 (09-06-20 @ 06:31) (66 - 82)  BP: 144/70 (09-06-20 @ 06:31) (131/82 - 179/114)  RR: 18 (09-06-20 @ 06:31) (18 - 18)  SpO2: 97% (09-06-20 @ 06:31) (96% - 98%)  Daily Height in cm: 167.64 (05 Sep 2020 08:35)      I&O's Summary    05 Sep 2020 07:01  -  06 Sep 2020 07:00  --------------------------------------------------------  IN: 240 mL / OUT: 0 mL / NET: 240 mL      CAPILLARY BLOOD GLUCOSE                          13.2   9.82  )-----------( 165      ( 06 Sep 2020 05:49 )             39.3     09-06    140  |  105  |  9   ----------------------------<  132<H>  3.2<L>   |  25  |  0.97    Ca    9.1      06 Sep 2020 05:45  Phos  3.2     09-06  Mg     2.0     09-06    TPro  7.8  /  Alb  4.5  /  TBili  0.8  /  DBili  x   /  AST  31  /  ALT  31  /  AlkPhos  81  09-04    PT/INR - ( 04 Sep 2020 23:50 )   PT: 11.5 sec;   INR: 0.96 ratio         PTT - ( 05 Sep 2020 10:31 )  PTT:100.6 sec  CARDIAC MARKERS ( 05 Sep 2020 09:35 )  x     / x     / 920 U/L / x     / 64.1 ng/mL          Radiology:    PHYSICAL EXAM:  GENERAL: NAD, well-developed  HEAD:  Atraumatic, Normocephalic  CHEST/LUNG: Clear to auscultation bilaterally; No wheeze  HEART: Regular rate and rhythm; No murmurs, rubs, or gallops  ABDOMEN: Soft, Nontender, Nondistended  EXTREMITIES:  2+ Peripheral Pulses, No clubbing, cyanosis, or edema  NEUROLOGY: non-focal, A&O x3      Assessment/Plan: HPI:  49yo Man with PMH of HTN, HLD, CAD s/p stent 2017, presents from home with acute onset shortness of breath. Found to have NSTEMI. Underwent LHC on 9/5/20 with JUDITH placed in pLAD 95% lesion, waiting for staged PCI to LCx today.    # chest pressure   - EKG  - Trop  - plan is cardiac cath today   - c/w ASA, brilinta, atorvastatin   - endorsed to day team     Anamaria Forbes PA-C  [id number]

## 2020-09-07 VITALS
SYSTOLIC BLOOD PRESSURE: 132 MMHG | TEMPERATURE: 98 F | DIASTOLIC BLOOD PRESSURE: 85 MMHG | OXYGEN SATURATION: 96 % | RESPIRATION RATE: 18 BRPM | HEART RATE: 79 BPM

## 2020-09-07 LAB
ANION GAP SERPL CALC-SCNC: 12 MMOL/L — SIGNIFICANT CHANGE UP (ref 5–17)
BUN SERPL-MCNC: 10 MG/DL — SIGNIFICANT CHANGE UP (ref 7–23)
CALCIUM SERPL-MCNC: 9.4 MG/DL — SIGNIFICANT CHANGE UP (ref 8.4–10.5)
CHLORIDE SERPL-SCNC: 106 MMOL/L — SIGNIFICANT CHANGE UP (ref 96–108)
CO2 SERPL-SCNC: 22 MMOL/L — SIGNIFICANT CHANGE UP (ref 22–31)
CREAT SERPL-MCNC: 0.95 MG/DL — SIGNIFICANT CHANGE UP (ref 0.5–1.3)
GLUCOSE SERPL-MCNC: 119 MG/DL — HIGH (ref 70–99)
HCT VFR BLD CALC: 40.6 % — SIGNIFICANT CHANGE UP (ref 39–50)
HGB BLD-MCNC: 13.4 G/DL — SIGNIFICANT CHANGE UP (ref 13–17)
MAGNESIUM SERPL-MCNC: 2 MG/DL — SIGNIFICANT CHANGE UP (ref 1.6–2.6)
MCHC RBC-ENTMCNC: 27.3 PG — SIGNIFICANT CHANGE UP (ref 27–34)
MCHC RBC-ENTMCNC: 33 GM/DL — SIGNIFICANT CHANGE UP (ref 32–36)
MCV RBC AUTO: 82.7 FL — SIGNIFICANT CHANGE UP (ref 80–100)
NRBC # BLD: 0 /100 WBCS — SIGNIFICANT CHANGE UP (ref 0–0)
PHOSPHATE SERPL-MCNC: 2.9 MG/DL — SIGNIFICANT CHANGE UP (ref 2.5–4.5)
PLATELET # BLD AUTO: 187 K/UL — SIGNIFICANT CHANGE UP (ref 150–400)
POTASSIUM SERPL-MCNC: 4 MMOL/L — SIGNIFICANT CHANGE UP (ref 3.5–5.3)
POTASSIUM SERPL-SCNC: 4 MMOL/L — SIGNIFICANT CHANGE UP (ref 3.5–5.3)
RBC # BLD: 4.91 M/UL — SIGNIFICANT CHANGE UP (ref 4.2–5.8)
RBC # FLD: 12.6 % — SIGNIFICANT CHANGE UP (ref 10.3–14.5)
SODIUM SERPL-SCNC: 140 MMOL/L — SIGNIFICANT CHANGE UP (ref 135–145)
WBC # BLD: 11.26 K/UL — HIGH (ref 3.8–10.5)
WBC # FLD AUTO: 11.26 K/UL — HIGH (ref 3.8–10.5)

## 2020-09-07 PROCEDURE — 82947 ASSAY GLUCOSE BLOOD QUANT: CPT

## 2020-09-07 PROCEDURE — C1874: CPT

## 2020-09-07 PROCEDURE — 87389 HIV-1 AG W/HIV-1&-2 AB AG IA: CPT

## 2020-09-07 PROCEDURE — 96375 TX/PRO/DX INJ NEW DRUG ADDON: CPT

## 2020-09-07 PROCEDURE — 93306 TTE W/DOPPLER COMPLETE: CPT

## 2020-09-07 PROCEDURE — 82330 ASSAY OF CALCIUM: CPT

## 2020-09-07 PROCEDURE — C9600: CPT | Mod: LC

## 2020-09-07 PROCEDURE — 83880 ASSAY OF NATRIURETIC PEPTIDE: CPT

## 2020-09-07 PROCEDURE — 83605 ASSAY OF LACTIC ACID: CPT

## 2020-09-07 PROCEDURE — 99239 HOSP IP/OBS DSCHRG MGMT >30: CPT

## 2020-09-07 PROCEDURE — 82435 ASSAY OF BLOOD CHLORIDE: CPT

## 2020-09-07 PROCEDURE — 99153 MOD SED SAME PHYS/QHP EA: CPT

## 2020-09-07 PROCEDURE — 99152 MOD SED SAME PHYS/QHP 5/>YRS: CPT

## 2020-09-07 PROCEDURE — 93458 L HRT ARTERY/VENTRICLE ANGIO: CPT | Mod: 59

## 2020-09-07 PROCEDURE — 85018 HEMOGLOBIN: CPT

## 2020-09-07 PROCEDURE — 82803 BLOOD GASES ANY COMBINATION: CPT

## 2020-09-07 PROCEDURE — C1760: CPT

## 2020-09-07 PROCEDURE — 85014 HEMATOCRIT: CPT

## 2020-09-07 PROCEDURE — 82553 CREATINE MB FRACTION: CPT

## 2020-09-07 PROCEDURE — 84484 ASSAY OF TROPONIN QUANT: CPT

## 2020-09-07 PROCEDURE — 84132 ASSAY OF SERUM POTASSIUM: CPT

## 2020-09-07 PROCEDURE — 80048 BASIC METABOLIC PNL TOTAL CA: CPT

## 2020-09-07 PROCEDURE — 85027 COMPLETE CBC AUTOMATED: CPT

## 2020-09-07 PROCEDURE — 82550 ASSAY OF CK (CPK): CPT

## 2020-09-07 PROCEDURE — 83036 HEMOGLOBIN GLYCOSYLATED A1C: CPT

## 2020-09-07 PROCEDURE — 93306 TTE W/DOPPLER COMPLETE: CPT | Mod: 26

## 2020-09-07 PROCEDURE — 99232 SBSQ HOSP IP/OBS MODERATE 35: CPT

## 2020-09-07 PROCEDURE — 71045 X-RAY EXAM CHEST 1 VIEW: CPT

## 2020-09-07 PROCEDURE — 85610 PROTHROMBIN TIME: CPT

## 2020-09-07 PROCEDURE — U0003: CPT

## 2020-09-07 PROCEDURE — C1887: CPT

## 2020-09-07 PROCEDURE — 75710 ARTERY X-RAYS ARM/LEG: CPT | Mod: LT,XU

## 2020-09-07 PROCEDURE — 85730 THROMBOPLASTIN TIME PARTIAL: CPT

## 2020-09-07 PROCEDURE — 84100 ASSAY OF PHOSPHORUS: CPT

## 2020-09-07 PROCEDURE — 93005 ELECTROCARDIOGRAM TRACING: CPT

## 2020-09-07 PROCEDURE — C1725: CPT

## 2020-09-07 PROCEDURE — 93454 CORONARY ARTERY ANGIO S&I: CPT | Mod: 59

## 2020-09-07 PROCEDURE — 90686 IIV4 VACC NO PRSV 0.5 ML IM: CPT

## 2020-09-07 PROCEDURE — C9606: CPT | Mod: LD

## 2020-09-07 PROCEDURE — 84295 ASSAY OF SERUM SODIUM: CPT

## 2020-09-07 PROCEDURE — C1753: CPT

## 2020-09-07 PROCEDURE — 80053 COMPREHEN METABOLIC PANEL: CPT

## 2020-09-07 PROCEDURE — 96374 THER/PROPH/DIAG INJ IV PUSH: CPT

## 2020-09-07 PROCEDURE — 83735 ASSAY OF MAGNESIUM: CPT

## 2020-09-07 PROCEDURE — 99285 EMERGENCY DEPT VISIT HI MDM: CPT | Mod: 25

## 2020-09-07 PROCEDURE — C1769: CPT

## 2020-09-07 PROCEDURE — 92978 ENDOLUMINL IVUS OCT C 1ST: CPT | Mod: LC

## 2020-09-07 PROCEDURE — 80061 LIPID PANEL: CPT

## 2020-09-07 RX ORDER — TICAGRELOR 90 MG/1
1 TABLET ORAL
Qty: 60 | Refills: 0
Start: 2020-09-07 | End: 2020-10-06

## 2020-09-07 RX ADMIN — LISINOPRIL 40 MILLIGRAM(S): 2.5 TABLET ORAL at 06:12

## 2020-09-07 RX ADMIN — TICAGRELOR 90 MILLIGRAM(S): 90 TABLET ORAL at 06:12

## 2020-09-07 RX ADMIN — INFLUENZA VIRUS VACCINE 0.5 MILLILITER(S): 15; 15; 15; 15 SUSPENSION INTRAMUSCULAR at 16:18

## 2020-09-07 RX ADMIN — Medication 81 MILLIGRAM(S): at 11:37

## 2020-09-07 RX ADMIN — Medication 100 MILLIGRAM(S): at 06:12

## 2020-09-07 RX ADMIN — TICAGRELOR 90 MILLIGRAM(S): 90 TABLET ORAL at 16:45

## 2020-09-07 NOTE — DISCHARGE NOTE PROVIDER - CARE PROVIDER_API CALL
Dr. Memo Hampton [phone] ([facility])  Phone: (   )    -  Fax: (   )    -  Follow Up Time: 1 week    Pushpa Barrow (MD; CONNOR; MPH)  Cardiovascular Disease; Internal Medicine  [address] [place] [address], [address] [place] [address]  Phone: [phone]  Fax: [phone]  Follow Up Time: Pushpa Barrow (MD; CONNOR; MPH)  Cardiovascular Disease; Internal Medicine  [address], [address] [place] [address]  Phone: [phone]  Fax: [phone]  Follow Up Time:     Dr. Memo Hampton [phone] ([facility])  Phone: (   )    -  Fax: (   )    -  Follow Up Time: 1 week    Avni Tesfaye  CARDIOVASCULAR DISEASE  [address] [place] [address]  Phone: [phone]  Fax: [phone]  Follow Up Time:

## 2020-09-07 NOTE — DISCHARGE NOTE PROVIDER - NSDCCPCAREPLAN_GEN_ALL_CORE_FT
PRINCIPAL DISCHARGE DIAGNOSIS  Diagnosis: NSTEMI (non-ST elevated myocardial infarction)  Assessment and Plan of Treatment: s/p cardiac catherization with stent placement  Take medication as directed  Follow-up with Cardiology

## 2020-09-07 NOTE — DISCHARGE NOTE PROVIDER - NSDCMRMEDTOKEN_GEN_ALL_CORE_FT
Aspirin Enteric Coated 81 mg oral delayed release tablet: 1 tab(s) orally once a day  Lipitor 40 mg oral tablet: 1 tab(s) orally once a day  lisinopril 40 mg oral tablet: 1 tab(s) orally once a day  Metoprolol Succinate  mg oral tablet, extended release: 1 tab(s) orally once a day Aspirin Enteric Coated 81 mg oral delayed release tablet: 1 tab(s) orally once a day  Lipitor 40 mg oral tablet: 1 tab(s) orally once a day  lisinopril 40 mg oral tablet: 1 tab(s) orally once a day  Metoprolol Succinate  mg oral tablet, extended release: 1 tab(s) orally once a day  ticagrelor 90 mg oral tablet: 1 tab(s) orally every 12 hours

## 2020-09-07 NOTE — DISCHARGE NOTE PROVIDER - NSFOLLOWUPCLINICS_GEN_ALL_ED_FT
[facility]  Cardiology  [address] [place] [address]  Phone: [phone]  Fax:   Follow Up Time: 1 week

## 2020-09-07 NOTE — CHART NOTE - NSCHARTNOTEFT_GEN_A_CORE
Discharge Note:    Requested by Dr. Lopez to facilitate d/c home. V/s, labs, diagnostics reviewed, pt stable to d/c now. Cardiology cleared pt. Dr. Lopez medically cleared w/ f/u as directed. Please refer to d/c note for hospital details.    Micaela Leon, NP-c  [phone]

## 2020-09-07 NOTE — DISCHARGE NOTE PROVIDER - PROVIDER TOKENS
FREE:[LAST:[Memo],PHONE:[(   )    -],FAX:[(   )    -],ADDRESS:[Dr. Hampton [phone] ([facility])],FOLLOWUP:[1 week]],PROVIDER:[TOKEN:[[id number]]] PROVIDER:[TOKEN:[[id number]]],FREE:[LAST:[Memo],PHONE:[(   )    -],FAX:[(   )    -],ADDRESS:[Dr. Hampton [phone] ([facility])],FOLLOWUP:[1 week]],PROVIDER:[TOKEN:[[id number]]]

## 2020-09-07 NOTE — DISCHARGE NOTE PROVIDER - CARE PROVIDERS DIRECT ADDRESSES
,DirectAddress_Unknown,[email or web address],DirectAddress_Unknown,[email or web address]

## 2020-09-07 NOTE — PROGRESS NOTE ADULT - SUBJECTIVE AND OBJECTIVE BOX
Overnight Events:   No overnight events. Pt. s/p stent to Lcx today. Had post procedural pain.   Unchanged from prior to procedure    Current Meds:  acetaminophen   Tablet .. 650 milliGRAM(s) Oral every 6 hours PRN  aspirin  chewable 81 milliGRAM(s) Oral daily  atorvastatin 40 milliGRAM(s) Oral at bedtime  influenza   Vaccine 0.5 milliLiter(s) IntraMuscular once  lisinopril 40 milliGRAM(s) Oral daily  metoprolol succinate  milliGRAM(s) Oral daily  ticagrelor 90 milliGRAM(s) Oral every 12 hours      Vitals:  T(F): 98.4 (09-06), Max: 98.4 (09-06)  HR: 67 (09-06) (67 - 82)  BP: 163/103 (09-06) (131/82 - 179/114)  RR: 19 (09-06)  SpO2: 99% (09-06)  I&O's Summary    05 Sep 2020 07:01  -  06 Sep 2020 07:00  --------------------------------------------------------  IN: 240 mL / OUT: 0 mL / NET: 240 mL        Physical Exam:  Appearance: No acute distress; well appearing  Eyes: PERRL, EOMI, pink conjunctiva  HENT: Normal oral mucosa  Cardiovascular: RRR, S1, S2, no murmurs, rubs, or gallops; no edema; no JVD  Respiratory: Clear to auscultation bilaterally  Gastrointestinal: soft, non-tender, non-distended with normal bowel sounds  Musculoskeletal: No clubbing; no joint deformity   Neurologic: Non-focal  Lymphatic: No lymphadenopathy  Psychiatry: AAOx3, mood & affect appropriate  Skin: No rashes, ecchymoses, or cyanosis                          13.2   9.82  )-----------( 165      ( 06 Sep 2020 05:49 )             39.3     09-06    140  |  105  |  9   ----------------------------<  132<H>  3.2<L>   |  25  |  0.97    Ca    9.1      06 Sep 2020 05:45  Phos  3.2     09-06  Mg     2.0     09-06    TPro  7.8  /  Alb  4.5  /  TBili  0.8  /  DBili  x   /  AST  31  /  ALT  31  /  AlkPhos  81  09-04    PT/INR - ( 04 Sep 2020 23:50 )   PT: 11.5 sec;   INR: 0.96 ratio         PTT - ( 05 Sep 2020 10:31 )  PTT:100.6 sec  CARDIAC MARKERS ( 05 Sep 2020 09:35 )  x     / x     / 920 U/L / x     / 64.1 ng/mL      Serum Pro-Brain Natriuretic Peptide: 26 pg/mL (09-04 @ 23:50)          New ECG(s): Personally reviewed    Echo:    Stress Testing:     Cath:    Imaging:    Interpretation of Telemetry:
Tomy Garcia  [phone]  All Cardiology service information can be found  on [email or web address], password: [email or web address]    Patient seen and examined at bedside.    Overnight Events: No events overnight. Pt feels well, denies chest pain, sob, or palpitations.     CONSTITUTIONAL:  No fevers or chills  HEENT: No rhinorrhea   SKIN:  No rash or itching.  CARDIOVASCULAR:  No chest pain, chest pressure or chest discomfort. No palpitations or edema.  RESPIRATORY:  No shortness of breath, cough or sputum.  GASTROINTESTINAL:  No nausea, vomiting or diarrhea. No abdominal pain.   GENITOURINARY:  Denies hematuria, dysuria.   NEUROLOGICAL:  No headache, dizziness, syncope.   MUSCULOSKELETAL:  No muscle, back pain, joint pain or stiffness.  HEMATOLOGIC:  No bleeding or bruising.          Current Meds:  acetaminophen   Tablet .. 650 milliGRAM(s) Oral every 6 hours PRN  aspirin  chewable 81 milliGRAM(s) Oral daily  atorvastatin 40 milliGRAM(s) Oral at bedtime  influenza   Vaccine 0.5 milliLiter(s) IntraMuscular once  lisinopril 40 milliGRAM(s) Oral daily  metoprolol succinate  milliGRAM(s) Oral daily  ticagrelor 90 milliGRAM(s) Oral every 12 hours      Vitals:  T(F): 98.1 (), Max: 99.1 ()  HR: 75 () (67 - 89)  BP: 130/86 () (113/75 - 175/112)  RR: 18 ()  SpO2: 98% ()  I&O's Summary    06 Sep 2020 07:01  -  07 Sep 2020 07:00  --------------------------------------------------------  IN: 100 mL / OUT: 375 mL / NET: -275 mL        Physical Exam:  Appearance: No acute distress  Eyes: PERRL, EOMI, pink conjunctiva  HEENT: Normal oral mucosa  Cardiovascular: RRR, S1, S2, no murmurs, rubs, or gallops; no edema b/l RFA sites c/d/i, no hematoma   Respiratory: Clear to auscultation bilaterally  Gastrointestinal: soft, non-tender, non-distended with normal bowel sounds  Musculoskeletal: No clubbing; no joint deformity   Neurologic: Non-focal  Lymphatic: No lymphadenopathy  Psychiatry: mood & affect appropriate  Skin: No rashes, ecchymoses, or cyanosis                          13.4   11.26 )-----------( 187      ( 07 Sep 2020 07:02 )             40.6     09-07    140  |  106  |  10  ----------------------------<  119<H>  4.0   |  22  |  0.95    Ca    9.4      07 Sep 2020 07:02  Phos  2.9     09-07  Mg     2.0     09-07      PTT - ( 05 Sep 2020 10:31 )  PTT:100.6 sec  CARDIAC MARKERS ( 05 Sep 2020 09:35 )  698 ng/L / x     / x     / 920 U/L / x     / 64.1 ng/mL  CARDIAC MARKERS ( 05 Sep 2020 02:59 )  380 ng/L / x     / x     / x     / x     / x      CARDIAC MARKERS ( 04 Sep 2020 23:50 )  24 ng/L / x     / x     / x     / x     / x          Serum Pro-Brain Natriuretic Peptide: 26 pg/mL ( @ 23:50)    Total Cholesterol: 103  LDL: 55  HDL: 30  T        New ECG(s): Personally reviewed      Interpretation of Telemetry:  
[facility] Division of Hospital Medicine  Georges Lopez MD, CONNOR  Pager (M-F, 8A-5P): [phone]  Other Times:  [phone]    Patient is a 50y old  Male who presents with a chief complaint of shortness of breath (06 Sep 2020 10:46)      SUBJECTIVE / OVERNIGHT EVENTS:  s/p staged PCI today. This morning c/o chest pain post-procedure.     MEDICATIONS  (STANDING):  aspirin  chewable 81 milliGRAM(s) Oral daily  atorvastatin 40 milliGRAM(s) Oral at bedtime  influenza   Vaccine 0.5 milliLiter(s) IntraMuscular once  lisinopril 40 milliGRAM(s) Oral daily  metoprolol succinate  milliGRAM(s) Oral daily  potassium chloride    Tablet ER 40 milliEquivalent(s) Oral every 4 hours  ticagrelor 90 milliGRAM(s) Oral every 12 hours    MEDICATIONS  (PRN):  acetaminophen   Tablet .. 650 milliGRAM(s) Oral every 6 hours PRN Moderate Pain (4 - 6)      I&O's Summary    05 Sep 2020 07:01  -  06 Sep 2020 07:00  --------------------------------------------------------  IN: 240 mL / OUT: 0 mL / NET: 240 mL    06 Sep 2020 07:01  -  06 Sep 2020 15:05  --------------------------------------------------------  IN: 0 mL / OUT: 375 mL / NET: -375 mL        PHYSICAL EXAM:  Vital Signs Last 24 Hrs  T(C): 37 (06 Sep 2020 13:56), Max: 37.3 (06 Sep 2020 13:30)  T(F): 98.6 (06 Sep 2020 13:56), Max: 99.1 (06 Sep 2020 13:30)  HR: 85 (06 Sep 2020 13:56) (67 - 89)  BP: 136/87 (06 Sep 2020 13:56) (117/78 - 175/112)  BP(mean): --  RR: 20 (06 Sep 2020 13:56) (16 - 20)  SpO2: 98% (06 Sep 2020 13:56) (96% - 100%)    CONSTITUTIONAL: NAD, well-developed, well-groomed  EYES: EOMI, conjunctiva and sclera clear  NECK: Supple, no JVD  RESPIRATORY: Normal respiratory effort; lungs are clear to auscultation bilaterally  CARDIOVASCULAR: Regular rate and rhythm, normal S1 and S2, no murmur/rub/gallop; No lower extremity edema  ABDOMEN: normoactive bowel sounds, soft, nontender to palpation, no rebound/guarding; no distension   MUSCULOSKELETAL:  Normal gait; no clubbing or cyanosis of digits; no joint swelling or tenderness to palpation  PSYCH: A+O to person, place, and time; affect appropriate  NEUROLOGY: CN 2-12 are intact and symmetric; no gross sensory deficits       LABS:                        13.2   9.82  )-----------( 165      ( 06 Sep 2020 05:49 )             39.3     09-06    140  |  105  |  9   ----------------------------<  132<H>  3.2<L>   |  25  |  0.97    Ca    9.1      06 Sep 2020 05:45  Phos  3.2     09-06  Mg     2.0     09-06    TPro  7.8  /  Alb  4.5  /  TBili  0.8  /  DBili  x   /  AST  31  /  ALT  31  /  AlkPhos  81  09-04    PT/INR - ( 04 Sep 2020 23:50 )   PT: 11.5 sec;   INR: 0.96 ratio         PTT - ( 05 Sep 2020 10:31 )  PTT:100.6 sec  CARDIAC MARKERS ( 05 Sep 2020 09:35 )  x     / x     / 920 U/L / x     / 64.1 ng/mL        RADIOLOGY & ADDITIONAL TESTS:    Lab Results Reviewed    COORDINATION OF CARE:  Care Discussed with Consultants/Other Providers:  Cardiology re: chest pain

## 2020-09-07 NOTE — PROGRESS NOTE ADULT - ASSESSMENT
50 M hx HTN HLD CAD s/p stent in 2007 a/w NSTEMI s/p PCI to LAD and LCx.    #CAD / NSTEMI  -c/w asa/brilinta and lipitor. Tolerating Metoprolol and Lisinopril  -Obtain TTE, no contraindication to discharge from cardiology standpoint    Tomy Jose PGY4  [phone]  All Cardiology service information can be found 24/7 on [email or web address], password: [email or web address]
50 M hx HTN HLD CAD s/p stent in 2007 p/w shortness of breath, found to have elevated troponin concerning for NSTEMI.    Angina   Currently chest pressure.   Likely due to balloon inflation  EKG no dynamic changes  Conitnue to monitor on tele  Can trend CKMB but would expect to rise, unlikely to  but may effect management if continues to rise and pain does not resolve  c/w dapt  Cont high intensity statin   c/w metoprolol 100mg daily  c/w lisinopril 40mg daily  PRN meds to control BP as might be cause of chest pain  Continue to monitor and reassess       Jose Elias Pa  Cardiology Fellow  [phone]    All Cardiology service information can be found 24/7 on [email or web address], password: [email or web address]
49yo Man with PMH of HTN, HLD, CAD s/p stent 2017, presents from home with acute onset shortness of breath. Found to have NSTEMI.

## 2020-09-07 NOTE — DISCHARGE NOTE PROVIDER - HOSPITAL COURSE
49yo Man with PMH of HTN, HLD, CAD s/p stent 2017, presents from home with acute onset shortness of breath. Found to have NSTEMI.     NSTEMI, underwent C 9/5: JUDITH placed in pLAD 95% lesion. Also s/p stage PCI today (9/6) JUDITH to LCx    Continue aspirin, brillinta, lipitor, metoprolol, lisinopril.    Discharge with cardiology following closely. 49yo Man with PMH of HTN, HLD, CAD s/p stent 2017, presents from home with acute onset shortness of breath. Found to have NSTEMI.   NSTEMI, underwent LHC 9/5: JUDITH placed in pLAD 95% lesion. Also s/p stage PCI today (9/6) JUDITH to LCx  Continue aspirin, brillinta, lipitor, metoprolol, lisinopril.  Discharge with cardiology following closely.     Discharge Day:  # NSTEMI - s/p angiogram 9/5 with JUDITH placed in pLAD 95% lesion. Also s/p stage PCI 9/6 with JUDITH to LCx  # Hypertension  # Hyperlipidemia

## 2020-09-07 NOTE — DISCHARGE NOTE NURSING/CASE MANAGEMENT/SOCIAL WORK - PATIENT PORTAL LINK FT
You can access the FollowMyHealth Patient Portal offered by [facility] by registering at the following website: [email or web address]. By joining [employer]’s FollowMyHealth portal, you will also be able to view your health information using other applications (apps) compatible with our system.

## 2020-09-07 NOTE — CHART NOTE - NSCHARTNOTEFT_GEN_A_CORE
Medicine Attending - Discharge Day Note:  ================================================    # NSTEMI - s/p angiogram 9/5 with JUDITH placed in pLAD 95% lesion. Also s/p stage PCI 9/6 with JUDITH to LCx  # Hypertension  # Hyperlipidemia     The patient is medically stable for discharge. Will follow up with cardiology outpatient.     Discharge time spent 35 minutes.     Georges Lopez MD, CONNOR  [phone]

## 2021-08-13 PROBLEM — Z00.00 ENCOUNTER FOR PREVENTIVE HEALTH EXAMINATION: Status: ACTIVE | Noted: 2021-08-13

## 2021-10-19 NOTE — H&P ADULT - PROBLEM SELECTOR PROBLEM 3
Medicare Annual Wellness Visit  Name: Gilberto Lawton Date: 10/19/2021   MRN: [id number] Sex: Female   Age: 79 y.o. Ethnicity: Non- / Non    : 1950 Race: White (non-)      Aidan Gonzalez is here for Medicare AWV    Screenings for behavioral, psychosocial and functional/safety risks, and cognitive dysfunction are all negative except as indicated below. These results, as well as other patient data from the [address] form, are documented in Flowsheets linked to this Encounter. Allergies   Allergen Reactions    Seasonal      Post-nasal drip   Headaches  Ear aches         Prior to Visit Medications    Medication Sig Taking? Authorizing Provider   blood glucose test strips (FREESTYLE LITE) strip 1 each by In Vitro route daily As needed. Yes Katya Smith MD   FreeStyle Lancets MISC USE ONCE A DAY. Yes Katya Smith MD   levothyroxine (SYNTHROID) 50 MCG tablet TAKE 1 TABLET BY MOUTH ON MONDAY, TUESDAY, AND WEDNESDAY.  Yes Katya Smith MD   levothyroxine (SYNTHROID) 75 MCG tablet Take 1 tablet by mouth See Admin Instructions Thursday- Yes Katya Smith MD   lovastatin (MEVACOR) 40 MG tablet TAKE ONE TABLET BY MOUTH EVERY DAY Yes Katya Smith MD   metFORMIN (GLUCOPHAGE) 1000 MG tablet Take 1 tablet by mouth 2 times daily (with meals) Yes Katya Smith MD   Multiple Vitamins-Minerals (PRESERVISION AREDS 2+MULTI VIT PO) Take by mouth Yes Historical Provider, MD   Omega-3 Fatty Acids ([employer] NATURAL FISH OIL PO) Take by mouth Yes Historical Provider, MD   Blood Glucose Monitoring Suppl (FREESTYLE FREEDOM LITE) w/Device KIT 1 kit by Does not apply route daily Yes Katya Smith MD   Polyethylene Glycol 3350 (MIRALAX PO) Take by mouth Indications: PRN Pt takes a teaspoon daily Yes Historical Provider, MD   Psyllium (METAMUCIL FIBER PO) Take by mouth Yes Historical Provider, MD   B Complex Vitamins (VITAMIN B COMPLEX PO) Take by mouth 2 times daily  Yes Historical Provider, MD Cholecalciferol (VITAMIN D3) 1000 UNITS TABS Take by mouth daily Yes Historical Provider, MD   cephALEXin (KEFLEX) 500 MG capsule TAKE ONE CAPSULE BY MOUTH FOUR TIMES A DAY  Historical Provider, MD   clobetasol (TEMOVATE) 0.05 % cream APPLY THREE TIMES DAILY FOR 3 WEEKS  THEN TWO TIMES DAILY FOR 2 WEEKS  THEN ONCE DAILY FOR 1 WEEK  Historical Provider, MD         Past Medical History:   Diagnosis Date    Actinic keratosis     Diabetes ([address]     Hyperlipidemia     Hypothyroidism     Lichen sclerosus     Rotator cuff impingement syndrome     Spinal arthritis     Spinal stenosis     Vertigo        Past Surgical History:   Procedure Laterality Date    BREAST BIOPSY      Stereotactic    COLONOSCOPY      COLONOSCOPY  10/01/2019    normal--christopher    COLONOSCOPY N/A 10/1/2019    COLONOSCOPY DIAGNOSTIC performed by Alonso Smith MD at [address]  MOHS SURGERY Right     right leg    THYROIDECTOMY, PARTIAL  01/2008    TUBAL LIGATION           Family History   Problem Relation Age of Onset    Cancer Mother         pancreatic    Diabetes Father     Cancer Father         Prostate    Heart Failure Father     Stroke Father         due to medical intervention    Cancer Sister         Breast CA x2    Kidney Disease Maternal Grandmother     Heart Disease Maternal Grandfather     Heart Attack Maternal Grandfather     Cancer Paternal Grandmother     Heart Failure Paternal Grandfather     Other Other         No [address] [place]       CareTeam (Including outside providers/suppliers regularly involved in providing care):   Patient Care Team:  Robby Baez DO as PCP - General (Family Medicine)  Robby Baez DO as PCP - [facility]    Wt Readings from Last 3 Encounters:   10/19/21 148 lb (67.1 kg)   10/18/21 148 lb 6.4 oz (67.3 kg)   10/12/21 147 lb 11.2 oz (67 kg)     Vitals:    10/19/21 0834   BP: 126/62   Pulse: 81   Resp: 18 Health Maintenance Status  Immunization History   Administered Date(s) Administered    COVID-19, Pfizer, PF, 30mcg/0.3mL 02/01/2021, 02/26/2021, 09/26/2021    Influenza A (D1V1-30) Vaccine PF IM 11/10/2009    Influenza Virus Vaccine 01/15/2013, 09/19/2013, 10/02/2014, 10/02/2015, 10/04/2016    Influenza, High Dose (Fluzone 65 yrs and older) 09/05/2017, 09/18/2018    Influenza, Quadv, IM, PF (6 mo and older Fluzone, Flulaval, Fluarix, and 3 yrs and older Afluria) 09/08/2020    Influenza, Quadv, adjuvanted, 65 yrs +, IM, PF (Fluad) 09/08/2020, 09/10/2021    Influenza, Triv, inactivated, subunit, adjuvanted, IM (Fluad 65 yrs and older) 10/17/2019    Pneumococcal Conjugate 13-valent (Islicen16) 04/19/2018    Pneumococcal Polysaccharide (Dypoajpew61) 07/25/2019        Health Maintenance   Topic Date Due    Hepatitis C screen  Never done    DTaP/Tdap/Td vaccine (1 - Tdap) Never done    Shingles Vaccine (1 of 2) Never done    Diabetic microalbuminuria test  07/28/2021    Diabetic foot exam  08/03/2021    Annual Wellness Visit (AWV)  10/15/2021    A1C test (Diabetic or Prediabetic)  09/24/2022    Lipid screen  09/24/2022    TSH testing  09/24/2022    Breast cancer screen  06/11/2023    Diabetic retinal exam  08/10/2023    Colon cancer screen colonoscopy  10/01/2024    DEXA (modify frequency per FRAX score)  Completed    Flu vaccine  Completed    Pneumococcal 65+ years Vaccine  Completed    COVID-19 Vaccine  Completed    Hepatitis A vaccine  Aged Out    Hib vaccine  Aged Out    Meningococcal (ACWY) vaccine  Aged Out     Recommendations for [employer] Due: see orders and patient instructions/AVS.  . Recommended screening schedule for the next 5-10 years is provided to the patient in written form: see Patient Instructions/AVS.    Neli Moss was seen today for medicare awv.     Diagnoses and all orders for this visit:    Routine general medical examination at a health care facility    Type 2 diabetes mellitus without complication, without long-term current use of insulin ([facility] [address] Hyperlipidemia

## 2021-10-29 ENCOUNTER — EMERGENCY (EMERGENCY)
Facility: HOSPITAL | Age: 51
LOS: 1 days | Discharge: HOME | End: 2021-10-29
Attending: INTERNAL MEDICINE | Admitting: INTERNAL MEDICINE
Payer: COMMERCIAL

## 2021-10-29 VITALS
HEIGHT: 66 IN | TEMPERATURE: 100 F | OXYGEN SATURATION: 95 % | HEART RATE: 105 BPM | DIASTOLIC BLOOD PRESSURE: 84 MMHG | RESPIRATION RATE: 16 BRPM | WEIGHT: 169.98 LBS | SYSTOLIC BLOOD PRESSURE: 137 MMHG

## 2021-10-29 PROCEDURE — 99284 EMERGENCY DEPT VISIT MOD MDM: CPT

## 2021-10-30 VITALS
OXYGEN SATURATION: 96 % | HEART RATE: 92 BPM | RESPIRATION RATE: 16 BRPM | DIASTOLIC BLOOD PRESSURE: 71 MMHG | SYSTOLIC BLOOD PRESSURE: 141 MMHG | TEMPERATURE: 99 F

## 2021-10-30 LAB — SARS-COV-2 RNA SPEC QL NAA+PROBE: SIGNIFICANT CHANGE UP

## 2021-10-30 PROCEDURE — 71045 X-RAY EXAM CHEST 1 VIEW: CPT | Mod: 26

## 2021-10-30 RX ADMIN — Medication 1 TABLET(S): at 01:47

## 2021-10-30 NOTE — ED PROVIDER NOTE - PATIENT PORTAL LINK FT
You can access the FollowMyHealth Patient Portal offered by [facility] by registering at the following website: [email or web address]. By joining [employer]’s FollowMyHealth portal, you will also be able to view your health information using other applications (apps) compatible with our system.

## 2021-10-30 NOTE — ED ADULT NURSE NOTE - BREATH SOUNDS, RIGHT
[address] Patient Status:  Inpatient   Age/Gender 72year old female MRN [id number]   [facility] 3SW-A Attending Rafa Dillon MD   Hosp Day # 0 PCP Johnny Shelton DO       Anesthesia Post-op Note    Procedure(s):  [facility]
clear

## 2021-10-30 NOTE — ED ADULT NURSE NOTE - OBJECTIVE STATEMENT
51 yr old male c/o cough and fever. covid vaccinated. PMH of covid. afebrile on admission to the ED. Non-productive cough. Lungs clear bilaterally. S1/S2. All other systems WDL. skin warm dry and intact. will continue to monitor.

## 2021-10-30 NOTE — ED PROVIDER NOTE - TOBACCO USE
Attending Note:   I have personally examined this patient and have reviewed the clinical presentation and progress note with the fellow. I agree with the treatment plan as outlined. The plan was formulated with the fellow on the day of the patient's visit. I have reviewed all imaging with the fellow and agree with the findings in the documentation. I have supervised the injection procedures performed by Dr. Nick.    Jill Fernandes MD, CAQ, CCD  [facility]  Sports Medicine and Bone Health   Unknown if ever smoked

## 2021-10-30 NOTE — ED PROVIDER NOTE - CARE PROVIDER_API CALL
Chente Dutta  INTERNAL MEDICINE  [facility] [facility], [address]  [place] [address]  Phone: [phone]  Fax: [phone]  Follow Up Time: 1-3 Days

## 2021-10-30 NOTE — ED PROVIDER NOTE - OBJECTIVE STATEMENT
fever, dry throat and cough X 4 days  fever fever, dry throat and cough X 4 days  cough mucous production  received the COVID vaccine 52 y/o male c/o fever, dry throat and cough X 4 days  the cough has slight  mucous production, no CP, no SOB   received the COVID vaccine

## 2021-10-31 PROCEDURE — 87635 SARS-COV-2 COVID-19 AMP PRB: CPT

## 2021-10-31 PROCEDURE — 99283 EMERGENCY DEPT VISIT LOW MDM: CPT | Mod: 25

## 2021-10-31 PROCEDURE — 82962 GLUCOSE BLOOD TEST: CPT

## 2021-10-31 PROCEDURE — 71045 X-RAY EXAM CHEST 1 VIEW: CPT

## 2021-12-16 NOTE — ED ADULT TRIAGE NOTE - NS ED NURSE BANDS TYPE
Psychology Progress Note    Date: December 16, 2021    Time length and type of treatment: 48 minutes (10:00 AM to 10:48 AM), individual therapy    After review of the patient's situation, this visit was changed from an in-person visit to a  video visit via [employer] and [employer] when connectivity problems required switching platforms, to reduce the risk of COVID 19 exposure. Patient was informed that policies and procedures that govern in-person sessions would also apply to  video sessions. Patient was also informed that  video sessions would be discontinued when COVID 19 exposure is no longer a concern (as determined by [facility]).     Patient location: Patient home in [place], [place]  Provider location:  [facility], [place], [place]    Patient was in agreement with proceeding with a  video session.      Necessity: This session is necessary to address the patient's PTSD, anxiety, and depressed mood.  Today we focus on revising the patient's treatment plan. The reader is invited to review the patient's full treatment plan in the Media section of the patient's Epic medical record.    Psychotherapeutic Technique: This writer utilized motivational interviewing, active listening, reassurance and support in the context of cognitive behavioral therapy to address the above.      MENTAL STATUS EVALUATION  Grooming: Well-groomed  Attire: Appropriate  Age: Appears Stated  Behavior Towards Examiner: Cooperative  Motor Activity: Within normal limits  Eye Contact: Avoidant  Mood: Anxious  Depressed   Affect: full range  Speech/Language: Mildly pressured  Attention: Easily distracted  Concentration: Brief  Thought Process: tangential  Thought Content: Clear    Orientation: Appeared oriented to person, place, and time, though not formally established  Memory: No evidence of impairment.  Judgement: Adequate  Estimated Intelligence: Average  Demonstrated Insight: Adequate  Fund of Knowledge:  "Adequate    Intervention:   Patient was readministered the PHQ-9 and KT-7 as part of revising her treatment plan. Her score of 4 on the KT-7 is suggestive of minimal anxiety while her score of 8 on the PHQ-9 is suggestive of mild depression.  At this time, existing diagnoses will be maintained and patient treatment plan was updated.      Patient reported that she had an argument with her sister because the patient's granddaughter is a hoarder, uses illegal substances in addition to having an alcohol use disorder, is involved in a relationship with a man who allegedly sells drugs, has allowed her sons to smoke marijuana in her presence and may even be supplying the marijuana, and the patient's sister feels that the children are not safe due to neglect.  Patient agreed that the children are not safe, stating their living conditions are unsafe, she questions if there is adequate food, and she believes her granddaughter's drug and alcohol use interferes with her ability to parent, but she does not want to be the \"bad samantha\" who files a report, and feels her sister should file a report since she has more frequent and recent contact with the children.  Patient was reminded that I am a mandated .  She expressed relief that I will be filing a report and provided contact phone numbers for her granddaughter and her sister.      Because the children live in [place], I spoke with an [facility] Child Protection Screener at 12:21 PM who stated a report should be filed. I filed an electronic report at 12:40 PM.     Progress:  Patient's treatment plan was jointly revised.    Plan:   We will meet again in 1 week to address the patient's PTSD, anxiety, and depressed mood.  Estimated duration of treatment is 10+ individual therapy sessions ([address]) at twice monthly intervals. Treatment is expected to be completed by September 2022.     Diagnosis:  Adjustment Disorder with mixed anxiety and depressed mood  Posttraumatic Stress " Disorder (PTSD)  Attention-Deficit/Hyperactivity Disorder, combined presentation   Name band;

## 2022-07-29 ENCOUNTER — OUTPATIENT (OUTPATIENT)
Dept: OUTPATIENT SERVICES | Facility: HOSPITAL | Age: 52
LOS: 1 days | Discharge: HOME | End: 2022-07-29

## 2022-07-29 VITALS — WEIGHT: 184.97 LBS | HEIGHT: 69 IN

## 2022-07-29 DIAGNOSIS — Z95.5 PRESENCE OF CORONARY ANGIOPLASTY IMPLANT AND GRAFT: Chronic | ICD-10-CM

## 2022-07-29 DIAGNOSIS — R94.31 ABNORMAL ELECTROCARDIOGRAM [ECG] [EKG]: ICD-10-CM

## 2022-07-29 LAB
ALBUMIN SERPL ELPH-MCNC: 4.3 G/DL — SIGNIFICANT CHANGE UP (ref 3.3–5)
ALP SERPL-CCNC: 83 U/L — SIGNIFICANT CHANGE UP (ref 40–120)
ALT FLD-CCNC: 27 U/L — SIGNIFICANT CHANGE UP (ref 4–41)
ANION GAP SERPL CALC-SCNC: 11 MMOL/L — SIGNIFICANT CHANGE UP (ref 7–14)
AST SERPL-CCNC: 27 U/L — SIGNIFICANT CHANGE UP (ref 4–40)
BILIRUB SERPL-MCNC: 0.8 MG/DL — SIGNIFICANT CHANGE UP (ref 0.2–1.2)
BUN SERPL-MCNC: 21 MG/DL — SIGNIFICANT CHANGE UP (ref 7–23)
CALCIUM SERPL-MCNC: 9.2 MG/DL — SIGNIFICANT CHANGE UP (ref 8.4–10.5)
CHLORIDE SERPL-SCNC: 104 MMOL/L — SIGNIFICANT CHANGE UP (ref 98–107)
CO2 SERPL-SCNC: 24 MMOL/L — SIGNIFICANT CHANGE UP (ref 22–31)
CREAT SERPL-MCNC: 1.01 MG/DL — SIGNIFICANT CHANGE UP (ref 0.5–1.3)
EGFR: 89 ML/MIN/1.73M2 — SIGNIFICANT CHANGE UP
GLUCOSE SERPL-MCNC: 95 MG/DL — SIGNIFICANT CHANGE UP (ref 70–99)
HCT VFR BLD CALC: 40.9 % — SIGNIFICANT CHANGE UP (ref 39–50)
HGB BLD-MCNC: 13.6 G/DL — SIGNIFICANT CHANGE UP (ref 13–17)
MAGNESIUM SERPL-MCNC: 1.9 MG/DL — SIGNIFICANT CHANGE UP (ref 1.6–2.6)
MCHC RBC-ENTMCNC: 27.8 PG — SIGNIFICANT CHANGE UP (ref 27–34)
MCHC RBC-ENTMCNC: 33.3 GM/DL — SIGNIFICANT CHANGE UP (ref 32–36)
MCV RBC AUTO: 83.6 FL — SIGNIFICANT CHANGE UP (ref 80–100)
NRBC # BLD: 0 /100 WBCS — SIGNIFICANT CHANGE UP
NRBC # FLD: 0 K/UL — SIGNIFICANT CHANGE UP
PHOSPHATE SERPL-MCNC: 3.6 MG/DL — SIGNIFICANT CHANGE UP (ref 2.5–4.5)
PLATELET # BLD AUTO: 191 K/UL — SIGNIFICANT CHANGE UP (ref 150–400)
POTASSIUM SERPL-MCNC: 3.8 MMOL/L — SIGNIFICANT CHANGE UP (ref 3.5–5.3)
POTASSIUM SERPL-SCNC: 3.8 MMOL/L — SIGNIFICANT CHANGE UP (ref 3.5–5.3)
PROT SERPL-MCNC: 7.5 G/DL — SIGNIFICANT CHANGE UP (ref 6–8.3)
RBC # BLD: 4.89 M/UL — SIGNIFICANT CHANGE UP (ref 4.2–5.8)
RBC # FLD: 12.4 % — SIGNIFICANT CHANGE UP (ref 10.3–14.5)
SODIUM SERPL-SCNC: 139 MMOL/L — SIGNIFICANT CHANGE UP (ref 135–145)
WBC # BLD: 8.69 K/UL — SIGNIFICANT CHANGE UP (ref 3.8–10.5)
WBC # FLD AUTO: 8.69 K/UL — SIGNIFICANT CHANGE UP (ref 3.8–10.5)

## 2022-07-29 PROCEDURE — 99152 MOD SED SAME PHYS/QHP 5/>YRS: CPT

## 2022-07-29 PROCEDURE — 0715T: CPT | Mod: NC,1L

## 2022-07-29 PROCEDURE — 93010 ELECTROCARDIOGRAM REPORT: CPT | Mod: 76

## 2022-07-29 PROCEDURE — 93571 IV DOP VEL&/PRESS C FLO 1ST: CPT | Mod: 26,RC

## 2022-07-29 PROCEDURE — 93458 L HRT ARTERY/VENTRICLE ANGIO: CPT | Mod: 26

## 2022-07-29 RX ORDER — ATORVASTATIN CALCIUM 80 MG/1
1 TABLET, FILM COATED ORAL
Qty: 0 | Refills: 0 | DISCHARGE

## 2022-07-29 RX ORDER — LISINOPRIL 2.5 MG/1
1 TABLET ORAL
Qty: 0 | Refills: 0 | DISCHARGE

## 2022-07-29 RX ORDER — SODIUM CHLORIDE 9 MG/ML
3 INJECTION INTRAMUSCULAR; INTRAVENOUS; SUBCUTANEOUS EVERY 8 HOURS
Refills: 0 | Status: DISCONTINUED | OUTPATIENT
Start: 2022-07-29 | End: 2022-08-12

## 2022-07-29 RX ORDER — SODIUM CHLORIDE 9 MG/ML
1000 INJECTION INTRAMUSCULAR; INTRAVENOUS; SUBCUTANEOUS
Refills: 0 | Status: DISCONTINUED | OUTPATIENT
Start: 2022-07-29 | End: 2022-08-12

## 2022-07-29 RX ORDER — METOPROLOL TARTRATE 50 MG
1 TABLET ORAL
Qty: 0 | Refills: 0 | DISCHARGE

## 2022-07-29 RX ORDER — ASPIRIN/CALCIUM CARB/MAGNESIUM 324 MG
1 TABLET ORAL
Qty: 0 | Refills: 0 | DISCHARGE

## 2022-07-29 RX ORDER — CLOTRIMAZOLE AND BETAMETHASONE DIPROPIONATE 10; .5 MG/G; MG/G
1 CREAM TOPICAL
Qty: 0 | Refills: 0 | DISCHARGE

## 2022-07-29 RX ADMIN — SODIUM CHLORIDE 3 MILLILITER(S): 9 INJECTION INTRAMUSCULAR; INTRAVENOUS; SUBCUTANEOUS at 15:00

## 2022-07-29 RX ADMIN — SODIUM CHLORIDE 150 MILLILITER(S): 9 INJECTION INTRAMUSCULAR; INTRAVENOUS; SUBCUTANEOUS at 15:35

## 2022-07-29 NOTE — H&P CARDIOLOGY - NEGATIVE RESPIRATORY AND THORAX SYMPTOMS
Message to [facility] staff are you please able to assist with scheduling thank you.  
Patient was seen by Dr. Middleton in the past, last office visit 3/27/2017 ( 1 year ago ).   Patient needs to be back for a follow-up. Patient appears to be on both Metoprolol and Atenolol.   Will defer refill until office visit.           
no wheezing/no dyspnea/no cough/no hemoptysis/no pleuritic chest pain

## 2022-07-29 NOTE — H&P CARDIOLOGY - HISTORY OF PRESENT ILLNESS
53 y/o male with a PMHx of CAD s/p stent placement (pLAD, OM1), HTN and HLD presents for elective cardiac catheterization. Pt reports that he has been in a good state of health, has remained physically active, and is able to ambulate up to 10 blocks without dyspnea, fatigue or chest pain. Pt has been compliant with all of his medications. Pt followed up his cardiologist, Dr. Morris, for routine follow up and underwent treadmill exercise stress test, which was abnormal and revealed exercise induced repolarization changes at submaximal heart rate. Pt denies fever, chills, recent travel, headache, dizziness, visual deficits, chest pain, shortness of breath, orthopnea, palpitations, abdominal pain, N/V/D/C, hematochezia, melena, dysuria, hematuria, LOC, syncope, peripheral edema. In light of patients cardiac risk factors and abnormal noninvasive test findings, there is suspicion for progression of CAD. Patient is now referred to [facility] for a cardiac catheterization with possible PTCA/stent.     Cardiologist: Dr. Cortez Morris  COVID status: PCR negative

## 2023-02-08 NOTE — ED ADULT NURSE NOTE - BREATH SOUNDS RUL
Is the patient currently in the state of [place]? YES    Visit mode:VIDEO    If the visit is dropped, the patient can be reconnected by: VIDEO VISIT: Send to e-mail at: [email or web address]    Will anyone else be joining the visit? NO      How would you like to obtain your AVS? MyChart    Are changes needed to the allergy or medication list? NO  N/A        
clear

## 2023-05-26 NOTE — DISCHARGE NOTE NURSING/CASE MANAGEMENT/SOCIAL WORK - NSDCVIVACCINE_GEN_ALL_CORE_FT
no
Influenza , 2020/9/7 16:18 , Williams Alvarenga (RN)  Tdap , 2020/8/29 20:58 , Talia Burnette (RN)

## 2023-06-02 NOTE — PATIENT PROFILE ADULT - FUNCTIONAL SCREEN CURRENT LEVEL: SWALLOWING (IF SCORE 2 OR MORE FOR ANY ITEM, CONSULT REHAB SERVICES), MLM)
[address] [facility] [address] [address] EMERGENCY DEPT  [address] [facility] [address]  Phone: [phone]      CHIEF COMPLAINT       Chief Complaint   Patient presents with    Flank Pain     right       Nurses Notes reviewed and I agree except as noted in the HPI. HISTORY OF PRESENT ILLNESS    Ray Painting is a 47 y.o. female. Patient presents with right flank pain going on over the last 4 days. We started recently on Cipro for a urinary tract infection. Pain seems to have gotten worse. No fever. Does have some back pain problems in the past.    REVIEW OF SYSTEMS         No fever    Remainder of review of systems is otherwise reviewed as negative. PAST MEDICAL HISTORY    has a past medical history of CAD (coronary artery disease), Chronic back pain, Diabetes ([address] GERD (gastroesophageal reflux disease), Helicobacter pylori (H. pylori), Hyperlipidemia, Hypertension, Liu syndrome, LUGO (nonalcoholic steatohepatitis), Pneumohemothorax, Prolonged emergence from general anesthesia, and Sacroiliac inflammation ([address] SURGICAL HISTORY      has a past surgical history that includes other surgical history (11/2009); other surgical history (11/2009); chest tube insertion (2011); Colonoscopy (05/10/2016); Cardiac catheterization (06/29/2016); Upper gastrointestinal endoscopy (05/10/2016 06/21/19); Cholecystectomy, laparoscopic (11/23/2016); Hysterectomy (08/22/2016); laparoscopic appendectomy (N/A, 10/27/2017); lumbar fusion (N/A, 3/1/2019); Lumbar spine surgery (N/A, 1/10/2020); Injection Procedure For Sacroiliac Joint (Bilateral, 6/30/2020); US BIOPSY LIVER PERCUTANEOUS (10/1/2020); Pain management procedure (Left, 12/14/2020); and EGD (2021).     CURRENT MEDICATIONS       Discharge Medication List as of 5/31/2023  7:08 PM        CONTINUE these medications which have NOT CHANGED    Details   fenofibrate (TRICOR) 145 MG tablet TAKE 1 TABLET BY MOUTH ONCE DAILY, Disp-90 tablet, R-1Normal      Cholecalciferol (VITAMIN D3)
0 = swallows foods/liquids without difficulty

## 2023-08-07 ENCOUNTER — EMERGENCY (EMERGENCY)
Facility: HOSPITAL | Age: 53
LOS: 1 days | Discharge: HOME | End: 2023-08-07
Attending: EMERGENCY MEDICINE | Admitting: EMERGENCY MEDICINE
Payer: COMMERCIAL

## 2023-08-07 VITALS
OXYGEN SATURATION: 98 % | DIASTOLIC BLOOD PRESSURE: 100 MMHG | WEIGHT: 175.05 LBS | SYSTOLIC BLOOD PRESSURE: 169 MMHG | HEART RATE: 63 BPM | HEIGHT: 66 IN | TEMPERATURE: 98 F | RESPIRATION RATE: 18 BRPM

## 2023-08-07 DIAGNOSIS — Z95.5 PRESENCE OF CORONARY ANGIOPLASTY IMPLANT AND GRAFT: Chronic | ICD-10-CM

## 2023-08-07 PROCEDURE — 99284 EMERGENCY DEPT VISIT MOD MDM: CPT

## 2023-08-07 NOTE — ED ADULT TRIAGE NOTE - CHIEF COMPLAINT QUOTE
pt checked blood pressure tonight, was high. takes his bp medications as prescribed in the morning. pt denies dizziness, denies headache, denies chest pain

## 2023-08-08 ENCOUNTER — NON-APPOINTMENT (OUTPATIENT)
Age: 53
End: 2023-08-08

## 2023-08-08 VITALS
DIASTOLIC BLOOD PRESSURE: 90 MMHG | HEART RATE: 60 BPM | SYSTOLIC BLOOD PRESSURE: 154 MMHG | OXYGEN SATURATION: 99 % | TEMPERATURE: 98 F | RESPIRATION RATE: 18 BRPM

## 2023-08-08 PROBLEM — E78.5 HYPERLIPIDEMIA, UNSPECIFIED: Chronic | Status: ACTIVE | Noted: 2022-07-29

## 2023-08-08 PROBLEM — I10 ESSENTIAL (PRIMARY) HYPERTENSION: Chronic | Status: ACTIVE | Noted: 2022-07-29

## 2023-08-08 PROBLEM — I25.10 ATHEROSCLEROTIC HEART DISEASE OF NATIVE CORONARY ARTERY WITHOUT ANGINA PECTORIS: Chronic | Status: ACTIVE | Noted: 2020-09-05

## 2023-08-08 PROCEDURE — 93010 ELECTROCARDIOGRAM REPORT: CPT

## 2023-08-08 PROCEDURE — 99283 EMERGENCY DEPT VISIT LOW MDM: CPT

## 2023-08-08 PROCEDURE — 93005 ELECTROCARDIOGRAM TRACING: CPT

## 2023-08-08 NOTE — ED PROVIDER NOTE - NSFOLLOWUPINSTRUCTIONS_ED_ALL_ED_FT
Follow up with Dr. Morris  Continue your blood pressure medications  call 911 for worsening symptoms or any concerns  Hypertension, Adult  High blood pressure (hypertension) is when the force of blood pumping through the arteries is too strong. The arteries are the blood vessels that carry blood from the heart throughout the body. Hypertension forces the heart to work harder to pump blood and may cause arteries to become narrow or stiff. Untreated or uncontrolled hypertension can lead to a heart attack, heart failure, a stroke, kidney disease, and other problems.    A blood pressure reading consists of a higher number over a lower number. Ideally, your blood pressure should be below 120/80. The first ("top") number is called the systolic pressure. It is a measure of the pressure in your arteries as your heart beats. The second ("bottom") number is called the diastolic pressure. It is a measure of the pressure in your arteries as the heart relaxes.    What are the causes?  The exact cause of this condition is not known. There are some conditions that result in high blood pressure.    What increases the risk?  Certain factors may make you more likely to develop high blood pressure. Some of these risk factors are under your control, including:  Smoking.  Not getting enough exercise or physical activity.  Being overweight.  Having too much fat, sugar, calories, or salt (sodium) in your diet.  Drinking too much alcohol.  Other risk factors include:  Having a personal history of heart disease, diabetes, high cholesterol, or kidney disease.  Stress.  Having a family history of high blood pressure and high cholesterol.  Having obstructive sleep apnea.  Age. The risk increases with age.  What are the signs or symptoms?  High blood pressure may not cause symptoms. Very high blood pressure (hypertensive crisis) may cause:  Headache.  Fast or irregular heartbeats (palpitations).  Shortness of breath.  Nosebleed.  Nausea and vomiting.  Vision changes.  Severe chest pain, dizziness, and seizures.  How is this diagnosed?  This condition is diagnosed by measuring your blood pressure while you are seated, with your arm resting on a flat surface, your legs uncrossed, and your feet flat on the floor. The cuff of the blood pressure monitor will be placed directly against the skin of your upper arm at the level of your heart. Blood pressure should be measured at least twice using the same arm. Certain conditions can cause a difference in blood pressure between your right and left arms.    If you have a high blood pressure reading during one visit or you have normal blood pressure with other risk factors, you may be asked to:  Return on a different day to have your blood pressure checked again.  Monitor your blood pressure at home for 1 week or longer.  If you are diagnosed with hypertension, you may have other blood or imaging tests to help your health care provider understand your overall risk for other conditions.    How is this treated?  This condition is treated by making healthy lifestyle changes, such as eating healthy foods, exercising more, and reducing your alcohol intake. You may be referred for counseling on a healthy diet and physical activity.    Your health care provider may prescribe medicine if lifestyle changes are not enough to get your blood pressure under control and if:  Your systolic blood pressure is above 130.  Your diastolic blood pressure is above 80.  Your personal target blood pressure may vary depending on your medical conditions, your age, and other factors.    Follow these instructions at home:  Eating and drinking    A plate with examples of foods in a healthy diet.  Eat a diet that is high in fiber and potassium, and low in sodium, added sugar, and fat. An example of this eating plan is called the DASH diet. DASH stands for Dietary Approaches to Stop Hypertension. To eat this way:  Eat plenty of fresh fruits and vegetables. Try to fill one half of your plate at each meal with fruits and vegetables.  Eat whole grains, such as whole-wheat pasta, brown rice, or whole-grain bread. Fill about one fourth of your plate with whole grains.  Eat or drink low-fat dairy products, such as skim milk or low-fat yogurt.  Avoid fatty cuts of meat, processed or cured meats, and poultry with skin. Fill about one fourth of your plate with lean proteins, such as fish, chicken without skin, beans, eggs, or tofu.  Avoid pre-made and processed foods. These tend to be higher in sodium, added sugar, and fat.  Reduce your daily sodium intake. Many people with hypertension should eat less than 1,500 mg of sodium a day.  Do not drink alcohol if:  Your health care provider tells you not to drink.  You are pregnant, may be pregnant, or are planning to become pregnant.  If you drink alcohol:  Limit how much you have to:  0–1 drink a day for women.  0–2 drinks a day for men.  Know how much alcohol is in your drink. In the [place] one drink equals one 12 oz bottle of beer (355 mL), one 5 oz glass of wine (148 mL), or one 1½ oz glass of hard liquor (44 mL).  Lifestyle    A blood pressure monitor and cuff.   Work with your health care provider to maintain a healthy body weight or to lose weight. Ask what an ideal weight is for you.  Get at least 30 minutes of exercise that causes your heart to beat faster (aerobic exercise) most days of the week. Activities may include walking, swimming, or biking.  Include exercise to strengthen your muscles (resistance exercise), such as Pilates or lifting weights, as part of your weekly exercise routine. Try to do these types of exercises for 30 minutes at least 3 days a week.  Do not use any products that contain nicotine or tobacco. These products include cigarettes, chewing tobacco, and vaping devices, such as e-cigarettes. If you need help quitting, ask your health care provider.  Monitor your blood pressure at home as told by your health care provider.  Keep all follow-up visits. This is important.  Medicines    Take over-the-counter and prescription medicines only as told by your health care provider. Follow directions carefully. Blood pressure medicines must be taken as prescribed.  Do not skip doses of blood pressure medicine. Doing this puts you at risk for problems and can make the medicine less effective.  Ask your health care provider about side effects or reactions to medicines that you should watch for.  Contact a health care provider if you:  Think you are having a reaction to a medicine you are taking.  Have headaches that keep coming back (recurring).  Feel dizzy.  Have swelling in your ankles.  Have trouble with your vision.  Get help right away if you:  Develop a severe headache or confusion.  Have unusual weakness or numbness.  Feel faint.  Have severe pain in your chest or abdomen.  Vomit repeatedly.  Have trouble breathing.  These symptoms may be an emergency. Get help right away. Call 911.  Do not wait to see if the symptoms will go away.  Do not drive yourself to the hospital.  Summary  Hypertension is when the force of blood pumping through your arteries is too strong. If this condition is not controlled, it may put you at risk for serious complications.  Your personal target blood pressure may vary depending on your medical conditions, your age, and other factors. For most people, a normal blood pressure is less than 120/80.  Hypertension is treated with lifestyle changes, medicines, or a combination of both. Lifestyle changes include losing weight, eating a healthy, low-sodium diet, exercising more, and limiting alcohol.  This information is not intended to replace advice given to you by your health care provider. Make sure you discuss any questions you have with your health care provider.

## 2023-08-08 NOTE — ED PROVIDER NOTE - OBJECTIVE STATEMENT
Patient is a 53-year-old male social smoker and drinker history of HLD, HTN, CAD.  Takes metoprolol lisinopril and atorvastatin with a baby aspirin daily.  Came home from work tonight was feeling a little lightheaded and dizzy checked his blood pressure on his blood pressure machine and found it to be elevated.  He repeated the blood pressure and it continued to go up.  With general concern for his wellbeing he came to the emergency room for evaluation.  He denies any chest pain shortness of breath fevers chills.  Denies any other neurologic complaints.  Denies edema.  By the time he arrived into the ER treatment and examination area patient's symptoms had gone away.  And he was fast asleep awaiting my evaluation.

## 2023-08-08 NOTE — ED PROVIDER NOTE - CARE PROVIDER_API CALL
Cortez Morris  Cardiovascular Disease  [address] [place] [address]  Phone: [phone]  Fax: [phone]  Follow Up Time:

## 2023-08-08 NOTE — ED PROVIDER NOTE - NSICDXPASTMEDICALHX_GEN_ALL_CORE_FT
PAST MEDICAL HISTORY:  CAD (coronary artery disease) s/p stent placement    HLD (hyperlipidemia)     HTN (hypertension)

## 2023-08-08 NOTE — ED PROVIDER NOTE - CONSTITUTIONAL, MLM
normal... Well appearing, awake, alert, oriented to person, place, time/situation and in no apparent distress. he was fast asleep, and upon awakening declined any illness or symptoms.

## 2023-08-08 NOTE — ED PROVIDER NOTE - CLINICAL SUMMARY MEDICAL DECISION MAKING FREE TEXT BOX
53 male with a history of hypertension presents to the emergency room feeling lightheaded and dizzy checking his blood pressure at home finding it to be elevated so he came to the emergency room for evaluation.  After period of time of observation in the waiting room after triage, patient was brought to the exam area fell asleep on the exam bed awoke with normal blood pressure no symptoms.  Has had no symptoms since his arrival in the emergency department.  He takes medication once a day in the morning.  He has no cardiac respiratory neurologic symptoms.  He has no musculoskeletal symptoms at this time.  His blood pressure is 146/85 on my evaluation.  He has got good pulses bilaterally with normal capillary refill.  Physical exam is otherwise unremarkable.  there are no stigmata of end organ injury on history or physicial. Plan of care EKG, referral to primary cardiology for blood pressure management, counseled patient.  This chart was made with dictation software and may contain typographical errors.

## 2023-08-08 NOTE — ED PROVIDER NOTE - PATIENT PORTAL LINK FT
You can access the FollowMyHealth Patient Portal offered by [facility] by registering at the following website: [email or web address]. By joining [employer]’s FollowMyHealth portal, you will also be able to view your health information using other applications (apps) compatible with our system.

## 2023-08-08 NOTE — ED ADULT NURSE NOTE - OBJECTIVE STATEMENT
patient comes in because he checked blood pressure tonight at home and it was high. patient takes his bp medications as prescribed in the morning. patient denies dizziness, denies headache, denies chest pain.

## 2023-08-08 NOTE — ED PROVIDER NOTE - PROGRESS NOTE DETAILS
Patient states he feels much better, blood pressure 146/85.  Declines laboratory studies or x-ray.  Will follow-up with his primary care physician.  Agrees to have an EKG.

## 2023-08-08 NOTE — ED ADULT NURSE NOTE - NSFALLUNIVINTERV_ED_ALL_ED
Bed/Stretcher in lowest position, wheels locked, appropriate side rails in place/Call bell, personal items and telephone in reach/Instruct patient to call for assistance before getting out of bed/chair/stretcher/Non-slip footwear applied when patient is off stretcher/[place] to call system/Physically safe environment - no spills, clutter or unnecessary equipment/Purposeful proactive rounding/Room/bathroom lighting operational, light cord in reach

## 2024-04-08 NOTE — ED PROVIDER NOTE - ENMT, MLM
Airway patent, Nasal mucosa clear. Mouth with normal mucosa. Throat slight injection , no oropharyngeal exudates and uvula is midline.
IV discontinued, cath removed intact

## 2024-04-28 NOTE — PROVIDER CONTACT NOTE (OTHER) - ASSESSMENT
PT. ALERT, NO S/S OF DISTRESS, NO CHEST PAIN, NO sob
Pt. alert, no s/s of distress, no headache or blurry vision verbalized
Pt. alert, verbalizes chest pressure, BP elevated
Alexandra